# Patient Record
Sex: FEMALE | Race: OTHER | HISPANIC OR LATINO | URBAN - METROPOLITAN AREA
[De-identification: names, ages, dates, MRNs, and addresses within clinical notes are randomized per-mention and may not be internally consistent; named-entity substitution may affect disease eponyms.]

---

## 2017-02-01 ENCOUNTER — OUTPATIENT (OUTPATIENT)
Dept: OUTPATIENT SERVICES | Facility: HOSPITAL | Age: 32
LOS: 1 days | End: 2017-02-01
Payer: SELF-PAY

## 2017-02-01 ENCOUNTER — LABORATORY RESULT (OUTPATIENT)
Age: 32
End: 2017-02-01

## 2017-02-01 ENCOUNTER — APPOINTMENT (OUTPATIENT)
Dept: INTERNAL MEDICINE | Facility: CLINIC | Age: 32
End: 2017-02-01

## 2017-02-01 VITALS
SYSTOLIC BLOOD PRESSURE: 142 MMHG | HEIGHT: 68 IN | BODY MASS INDEX: 29.7 KG/M2 | WEIGHT: 196 LBS | DIASTOLIC BLOOD PRESSURE: 90 MMHG

## 2017-02-01 DIAGNOSIS — E55.9 VITAMIN D DEFICIENCY, UNSPECIFIED: ICD-10-CM

## 2017-02-01 DIAGNOSIS — R21 RASH AND OTHER NONSPECIFIC SKIN ERUPTION: ICD-10-CM

## 2017-02-01 DIAGNOSIS — R42 DIZZINESS AND GIDDINESS: ICD-10-CM

## 2017-02-01 DIAGNOSIS — Z78.9 OTHER SPECIFIED HEALTH STATUS: ICD-10-CM

## 2017-02-01 DIAGNOSIS — R35.8 XXOTHER POLYURIA: ICD-10-CM

## 2017-02-01 DIAGNOSIS — L50.8 OTHER URTICARIA: ICD-10-CM

## 2017-02-01 DIAGNOSIS — F17.210 NICOTINE DEPENDENCE, CIGARETTES, UNCOMPLICATED: ICD-10-CM

## 2017-02-01 DIAGNOSIS — Z00.00 ENCOUNTER FOR GENERAL ADULT MEDICAL EXAMINATION WITHOUT ABNORMAL FINDINGS: ICD-10-CM

## 2017-02-01 DIAGNOSIS — I48.1 PERSISTENT ATRIAL FIBRILLATION: ICD-10-CM

## 2017-02-01 DIAGNOSIS — Z91.09 OTHER ALLERGY STATUS, OTHER THAN TO DRUGS AND BIOLOGICAL SUBSTANCES: ICD-10-CM

## 2017-02-01 DIAGNOSIS — I10 ESSENTIAL (PRIMARY) HYPERTENSION: ICD-10-CM

## 2017-02-01 DIAGNOSIS — T78.1XXA OTHER ADVERSE FOOD REACTIONS, NOT ELSEWHERE CLASSIFIED, INITIAL ENCOUNTER: ICD-10-CM

## 2017-02-01 DIAGNOSIS — J32.9 CHRONIC SINUSITIS, UNSPECIFIED: ICD-10-CM

## 2017-02-01 PROCEDURE — 90688 IIV4 VACCINE SPLT 0.5 ML IM: CPT

## 2017-02-01 PROCEDURE — G0463: CPT

## 2017-02-01 PROCEDURE — G0008: CPT

## 2017-02-02 DIAGNOSIS — R35.8 OTHER POLYURIA: ICD-10-CM

## 2017-02-02 DIAGNOSIS — Z23 ENCOUNTER FOR IMMUNIZATION: ICD-10-CM

## 2017-02-02 DIAGNOSIS — E66.3 OVERWEIGHT: ICD-10-CM

## 2017-02-02 DIAGNOSIS — J32.9 CHRONIC SINUSITIS, UNSPECIFIED: ICD-10-CM

## 2017-02-02 LAB
APPEARANCE: CLEAR
BILIRUBIN URINE: NEGATIVE
BLOOD URINE: NEGATIVE
COLOR: YELLOW
GLUCOSE QUALITATIVE U: NORMAL MG/DL
KETONES URINE: ABNORMAL
LEUKOCYTE ESTERASE URINE: NEGATIVE
NITRITE URINE: NEGATIVE
PH URINE: 5.5
PROTEIN URINE: NEGATIVE MG/DL
SPECIFIC GRAVITY URINE: 1.02
UROBILINOGEN URINE: NORMAL MG/DL

## 2017-02-27 ENCOUNTER — APPOINTMENT (OUTPATIENT)
Dept: OTOLARYNGOLOGY | Facility: CLINIC | Age: 32
End: 2017-02-27

## 2017-03-20 ENCOUNTER — APPOINTMENT (OUTPATIENT)
Dept: OTOLARYNGOLOGY | Facility: CLINIC | Age: 32
End: 2017-03-20

## 2017-03-20 VITALS
WEIGHT: 196 LBS | BODY MASS INDEX: 29.7 KG/M2 | HEIGHT: 68 IN | DIASTOLIC BLOOD PRESSURE: 85 MMHG | SYSTOLIC BLOOD PRESSURE: 137 MMHG | HEART RATE: 103 BPM

## 2017-08-14 ENCOUNTER — OUTPATIENT (OUTPATIENT)
Dept: OUTPATIENT SERVICES | Facility: HOSPITAL | Age: 32
LOS: 1 days | Discharge: HOME | End: 2017-08-14

## 2017-08-14 ENCOUNTER — APPOINTMENT (OUTPATIENT)
Dept: OTOLARYNGOLOGY | Facility: CLINIC | Age: 32
End: 2017-08-14
Payer: COMMERCIAL

## 2017-08-14 PROCEDURE — 99213 OFFICE O/P EST LOW 20 MIN: CPT

## 2017-08-24 DIAGNOSIS — J45.909 UNSPECIFIED ASTHMA, UNCOMPLICATED: ICD-10-CM

## 2017-08-24 DIAGNOSIS — J33.9 NASAL POLYP, UNSPECIFIED: ICD-10-CM

## 2017-08-24 DIAGNOSIS — J32.9 CHRONIC SINUSITIS, UNSPECIFIED: ICD-10-CM

## 2017-09-13 ENCOUNTER — APPOINTMENT (OUTPATIENT)
Dept: INTERNAL MEDICINE | Facility: CLINIC | Age: 32
End: 2017-09-13

## 2017-09-27 ENCOUNTER — APPOINTMENT (OUTPATIENT)
Dept: INTERNAL MEDICINE | Facility: CLINIC | Age: 32
End: 2017-09-27

## 2017-10-04 ENCOUNTER — APPOINTMENT (OUTPATIENT)
Dept: INTERNAL MEDICINE | Facility: CLINIC | Age: 32
End: 2017-10-04

## 2017-10-18 ENCOUNTER — APPOINTMENT (OUTPATIENT)
Dept: INTERNAL MEDICINE | Facility: CLINIC | Age: 32
End: 2017-10-18

## 2017-11-01 ENCOUNTER — APPOINTMENT (OUTPATIENT)
Dept: INTERNAL MEDICINE | Facility: CLINIC | Age: 32
End: 2017-11-01

## 2017-12-06 ENCOUNTER — LABORATORY RESULT (OUTPATIENT)
Age: 32
End: 2017-12-06

## 2017-12-06 ENCOUNTER — OUTPATIENT (OUTPATIENT)
Dept: OUTPATIENT SERVICES | Facility: HOSPITAL | Age: 32
LOS: 1 days | End: 2017-12-06
Payer: SELF-PAY

## 2017-12-06 ENCOUNTER — APPOINTMENT (OUTPATIENT)
Dept: INTERNAL MEDICINE | Facility: CLINIC | Age: 32
End: 2017-12-06

## 2017-12-06 VITALS
BODY MASS INDEX: 31.37 KG/M2 | SYSTOLIC BLOOD PRESSURE: 130 MMHG | DIASTOLIC BLOOD PRESSURE: 90 MMHG | WEIGHT: 207 LBS | HEIGHT: 68 IN

## 2017-12-06 DIAGNOSIS — I10 ESSENTIAL (PRIMARY) HYPERTENSION: ICD-10-CM

## 2017-12-06 DIAGNOSIS — F17.200 NICOTINE DEPENDENCE, UNSPECIFIED, UNCOMPLICATED: ICD-10-CM

## 2017-12-06 LAB
HCT VFR BLD CALC: 46.3 % — SIGNIFICANT CHANGE UP (ref 39–50)
HGB BLD-MCNC: 16.3 G/DL — SIGNIFICANT CHANGE UP (ref 13–17)
MCHC RBC-ENTMCNC: 32.1 PG — SIGNIFICANT CHANGE UP (ref 27–34)
MCHC RBC-ENTMCNC: 35.2 GM/DL — SIGNIFICANT CHANGE UP (ref 32–36)
MCV RBC AUTO: 91.1 FL — SIGNIFICANT CHANGE UP (ref 80–100)
PLATELET # BLD AUTO: 204 K/UL — SIGNIFICANT CHANGE UP (ref 150–400)
RBC # BLD: 5.08 M/UL — SIGNIFICANT CHANGE UP (ref 4.2–5.8)
RBC # FLD: 12.6 % — SIGNIFICANT CHANGE UP (ref 10.3–14.5)
WBC # BLD: 10.1 K/UL — SIGNIFICANT CHANGE UP (ref 3.8–10.5)
WBC # FLD AUTO: 10.1 K/UL — SIGNIFICANT CHANGE UP (ref 3.8–10.5)

## 2017-12-06 PROCEDURE — G0463: CPT

## 2017-12-06 PROCEDURE — 85027 COMPLETE CBC AUTOMATED: CPT

## 2017-12-06 PROCEDURE — 80061 LIPID PANEL: CPT

## 2017-12-06 PROCEDURE — 80053 COMPREHEN METABOLIC PANEL: CPT

## 2017-12-06 RX ORDER — FEXOFENADINE HYDROCHLORIDE 180 MG/1
180 TABLET ORAL DAILY
Qty: 30 | Refills: 2 | Status: COMPLETED | COMMUNITY
Start: 2017-08-14 | End: 2017-12-06

## 2017-12-07 LAB
ALBUMIN SERPL ELPH-MCNC: 4.9 G/DL — SIGNIFICANT CHANGE UP (ref 3.3–5)
ALP SERPL-CCNC: 91 U/L — SIGNIFICANT CHANGE UP (ref 40–120)
ALT FLD-CCNC: 160 U/L — HIGH (ref 10–45)
ANION GAP SERPL CALC-SCNC: 16 MMOL/L — SIGNIFICANT CHANGE UP (ref 5–17)
AST SERPL-CCNC: 59 U/L — HIGH (ref 10–40)
BILIRUB SERPL-MCNC: 0.7 MG/DL — SIGNIFICANT CHANGE UP (ref 0.2–1.2)
BUN SERPL-MCNC: 12 MG/DL — SIGNIFICANT CHANGE UP (ref 7–23)
CALCIUM SERPL-MCNC: 9.5 MG/DL — SIGNIFICANT CHANGE UP (ref 8.4–10.5)
CHLORIDE SERPL-SCNC: 100 MMOL/L — SIGNIFICANT CHANGE UP (ref 96–108)
CHOLEST SERPL-MCNC: 207 MG/DL — HIGH (ref 10–199)
CO2 SERPL-SCNC: 26 MMOL/L — SIGNIFICANT CHANGE UP (ref 22–31)
CREAT SERPL-MCNC: 0.81 MG/DL — SIGNIFICANT CHANGE UP (ref 0.5–1.3)
GLUCOSE SERPL-MCNC: 86 MG/DL — SIGNIFICANT CHANGE UP (ref 70–99)
HDLC SERPL-MCNC: 39 MG/DL — LOW (ref 40–125)
LIPID PNL WITH DIRECT LDL SERPL: 121 MG/DL — SIGNIFICANT CHANGE UP
POTASSIUM SERPL-MCNC: 4.1 MMOL/L — SIGNIFICANT CHANGE UP (ref 3.5–5.3)
POTASSIUM SERPL-SCNC: 4.1 MMOL/L — SIGNIFICANT CHANGE UP (ref 3.5–5.3)
PROT SERPL-MCNC: 7.5 G/DL — SIGNIFICANT CHANGE UP (ref 6–8.3)
SODIUM SERPL-SCNC: 142 MMOL/L — SIGNIFICANT CHANGE UP (ref 135–145)
TOTAL CHOLESTEROL/HDL RATIO MEASUREMENT: 5.3 RATIO — SIGNIFICANT CHANGE UP (ref 3.4–9.6)
TRIGL SERPL-MCNC: 235 MG/DL — HIGH (ref 10–149)

## 2017-12-14 DIAGNOSIS — J32.9 CHRONIC SINUSITIS, UNSPECIFIED: ICD-10-CM

## 2017-12-14 DIAGNOSIS — E66.3 OVERWEIGHT: ICD-10-CM

## 2017-12-15 ENCOUNTER — LABORATORY RESULT (OUTPATIENT)
Age: 32
End: 2017-12-15

## 2017-12-15 ENCOUNTER — APPOINTMENT (OUTPATIENT)
Dept: INTERNAL MEDICINE | Facility: CLINIC | Age: 32
End: 2017-12-15

## 2017-12-15 ENCOUNTER — OUTPATIENT (OUTPATIENT)
Dept: OUTPATIENT SERVICES | Facility: HOSPITAL | Age: 32
LOS: 1 days | End: 2017-12-15
Payer: SELF-PAY

## 2017-12-15 VITALS
SYSTOLIC BLOOD PRESSURE: 120 MMHG | HEIGHT: 68 IN | DIASTOLIC BLOOD PRESSURE: 82 MMHG | HEART RATE: 66 BPM | BODY MASS INDEX: 30.31 KG/M2 | WEIGHT: 200 LBS | OXYGEN SATURATION: 98 %

## 2017-12-15 DIAGNOSIS — I10 ESSENTIAL (PRIMARY) HYPERTENSION: ICD-10-CM

## 2017-12-15 DIAGNOSIS — J32.9 CHRONIC SINUSITIS, UNSPECIFIED: ICD-10-CM

## 2017-12-15 LAB
ALBUMIN SERPL ELPH-MCNC: 5 G/DL — SIGNIFICANT CHANGE UP (ref 3.3–5)
ALP SERPL-CCNC: 77 U/L — SIGNIFICANT CHANGE UP (ref 40–120)
ALT FLD-CCNC: 179 U/L — HIGH (ref 10–45)
ANION GAP SERPL CALC-SCNC: 16 MMOL/L — SIGNIFICANT CHANGE UP (ref 5–17)
AST SERPL-CCNC: 75 U/L — HIGH (ref 10–40)
BILIRUB SERPL-MCNC: 0.9 MG/DL — SIGNIFICANT CHANGE UP (ref 0.2–1.2)
BUN SERPL-MCNC: 12 MG/DL — SIGNIFICANT CHANGE UP (ref 7–23)
CALCIUM SERPL-MCNC: 10.2 MG/DL — SIGNIFICANT CHANGE UP (ref 8.4–10.5)
CHLORIDE SERPL-SCNC: 100 MMOL/L — SIGNIFICANT CHANGE UP (ref 96–108)
CO2 SERPL-SCNC: 25 MMOL/L — SIGNIFICANT CHANGE UP (ref 22–31)
CREAT SERPL-MCNC: 0.81 MG/DL — SIGNIFICANT CHANGE UP (ref 0.5–1.3)
GLUCOSE SERPL-MCNC: 94 MG/DL — SIGNIFICANT CHANGE UP (ref 70–99)
IRON SATN MFR SERPL: 39 % — SIGNIFICANT CHANGE UP (ref 16–55)
IRON SATN MFR SERPL: 98 UG/DL — SIGNIFICANT CHANGE UP (ref 45–165)
POTASSIUM SERPL-MCNC: 4.6 MMOL/L — SIGNIFICANT CHANGE UP (ref 3.5–5.3)
POTASSIUM SERPL-SCNC: 4.6 MMOL/L — SIGNIFICANT CHANGE UP (ref 3.5–5.3)
PROT SERPL-MCNC: 7.8 G/DL — SIGNIFICANT CHANGE UP (ref 6–8.3)
SODIUM SERPL-SCNC: 141 MMOL/L — SIGNIFICANT CHANGE UP (ref 135–145)
TIBC SERPL-MCNC: 252 UG/DL — SIGNIFICANT CHANGE UP (ref 220–430)
UIBC SERPL-MCNC: 154 UG/DL — SIGNIFICANT CHANGE UP (ref 110–370)

## 2017-12-15 PROCEDURE — 80053 COMPREHEN METABOLIC PANEL: CPT

## 2017-12-15 PROCEDURE — G0463: CPT

## 2017-12-15 PROCEDURE — 83550 IRON BINDING TEST: CPT

## 2017-12-15 PROCEDURE — 86704 HEP B CORE ANTIBODY TOTAL: CPT

## 2017-12-15 PROCEDURE — 87340 HEPATITIS B SURFACE AG IA: CPT

## 2017-12-15 PROCEDURE — 82728 ASSAY OF FERRITIN: CPT

## 2017-12-15 PROCEDURE — 86803 HEPATITIS C AB TEST: CPT

## 2017-12-15 PROCEDURE — 87389 HIV-1 AG W/HIV-1&-2 AB AG IA: CPT

## 2017-12-15 PROCEDURE — 86706 HEP B SURFACE ANTIBODY: CPT

## 2017-12-16 LAB
FERRITIN SERPL-MCNC: 932 NG/ML — HIGH (ref 30–400)
HBV CORE AB SER-ACNC: SIGNIFICANT CHANGE UP
HBV SURFACE AB SER-ACNC: SIGNIFICANT CHANGE UP
HBV SURFACE AG SER-ACNC: SIGNIFICANT CHANGE UP
HCV AB S/CO SERPL IA: 0.23 S/CO — SIGNIFICANT CHANGE UP
HCV AB SERPL-IMP: SIGNIFICANT CHANGE UP
HIV 1+2 AB+HIV1 P24 AG SERPL QL IA: SIGNIFICANT CHANGE UP

## 2017-12-21 DIAGNOSIS — R74.0 NONSPECIFIC ELEVATION OF LEVELS OF TRANSAMINASE AND LACTIC ACID DEHYDROGENASE [LDH]: ICD-10-CM

## 2018-01-31 ENCOUNTER — APPOINTMENT (OUTPATIENT)
Dept: PULMONOLOGY | Facility: CLINIC | Age: 33
End: 2018-01-31
Payer: COMMERCIAL

## 2018-01-31 VITALS
DIASTOLIC BLOOD PRESSURE: 80 MMHG | HEIGHT: 68 IN | HEART RATE: 73 BPM | RESPIRATION RATE: 16 BRPM | WEIGHT: 194 LBS | SYSTOLIC BLOOD PRESSURE: 130 MMHG | BODY MASS INDEX: 29.4 KG/M2 | TEMPERATURE: 99 F

## 2018-01-31 DIAGNOSIS — J30.9 ALLERGIC RHINITIS, UNSPECIFIED: ICD-10-CM

## 2018-01-31 DIAGNOSIS — E66.3 OVERWEIGHT: ICD-10-CM

## 2018-01-31 PROCEDURE — 94726 PLETHYSMOGRAPHY LUNG VOLUMES: CPT | Mod: GC

## 2018-01-31 PROCEDURE — 94729 DIFFUSING CAPACITY: CPT | Mod: GC

## 2018-01-31 PROCEDURE — 94060 EVALUATION OF WHEEZING: CPT | Mod: GC

## 2018-01-31 PROCEDURE — 99214 OFFICE O/P EST MOD 30 MIN: CPT | Mod: 25,GC

## 2018-01-31 PROCEDURE — ZZZZZ: CPT

## 2018-03-16 ENCOUNTER — APPOINTMENT (OUTPATIENT)
Dept: PEDIATRIC ALLERGY IMMUNOLOGY | Facility: CLINIC | Age: 33
End: 2018-03-16

## 2018-04-16 ENCOUNTER — APPOINTMENT (OUTPATIENT)
Dept: OTOLARYNGOLOGY | Facility: CLINIC | Age: 33
End: 2018-04-16

## 2018-06-18 ENCOUNTER — OUTPATIENT (OUTPATIENT)
Dept: OUTPATIENT SERVICES | Facility: HOSPITAL | Age: 33
LOS: 1 days | Discharge: HOME | End: 2018-06-18

## 2018-06-18 ENCOUNTER — APPOINTMENT (OUTPATIENT)
Dept: OTOLARYNGOLOGY | Facility: CLINIC | Age: 33
End: 2018-06-18
Payer: COMMERCIAL

## 2018-06-18 VITALS — HEIGHT: 68 IN | BODY MASS INDEX: 31.37 KG/M2 | WEIGHT: 207 LBS

## 2018-06-18 PROCEDURE — 99213 OFFICE O/P EST LOW 20 MIN: CPT

## 2018-06-25 DIAGNOSIS — J32.9 CHRONIC SINUSITIS, UNSPECIFIED: ICD-10-CM

## 2018-06-25 DIAGNOSIS — J33.9 NASAL POLYP, UNSPECIFIED: ICD-10-CM

## 2020-12-06 ENCOUNTER — EMERGENCY (EMERGENCY)
Facility: HOSPITAL | Age: 35
LOS: 1 days | Discharge: HOME | End: 2020-12-06
Attending: EMERGENCY MEDICINE
Payer: MEDICAID

## 2020-12-06 VITALS
WEIGHT: 199.96 LBS | OXYGEN SATURATION: 98 % | TEMPERATURE: 98 F | RESPIRATION RATE: 18 BRPM | SYSTOLIC BLOOD PRESSURE: 160 MMHG | HEIGHT: 66 IN | HEART RATE: 101 BPM | DIASTOLIC BLOOD PRESSURE: 91 MMHG

## 2020-12-06 VITALS
HEART RATE: 80 BPM | OXYGEN SATURATION: 99 % | SYSTOLIC BLOOD PRESSURE: 130 MMHG | RESPIRATION RATE: 18 BRPM | TEMPERATURE: 99 F | DIASTOLIC BLOOD PRESSURE: 85 MMHG

## 2020-12-06 DIAGNOSIS — I10 ESSENTIAL (PRIMARY) HYPERTENSION: ICD-10-CM

## 2020-12-06 LAB
ALBUMIN SERPL ELPH-MCNC: 4.8 G/DL — SIGNIFICANT CHANGE UP (ref 3.3–5)
ALP SERPL-CCNC: 80 U/L — SIGNIFICANT CHANGE UP (ref 40–120)
ALT FLD-CCNC: 106 U/L — HIGH (ref 10–45)
ANION GAP SERPL CALC-SCNC: 14 MMOL/L — SIGNIFICANT CHANGE UP (ref 5–17)
AST SERPL-CCNC: 48 U/L — HIGH (ref 10–40)
BASOPHILS # BLD AUTO: 0.06 K/UL — SIGNIFICANT CHANGE UP (ref 0–0.2)
BASOPHILS NFR BLD AUTO: 0.7 % — SIGNIFICANT CHANGE UP (ref 0–2)
BILIRUB SERPL-MCNC: 0.6 MG/DL — SIGNIFICANT CHANGE UP (ref 0.2–1.2)
BUN SERPL-MCNC: 6 MG/DL — LOW (ref 7–23)
CALCIUM SERPL-MCNC: 9.8 MG/DL — SIGNIFICANT CHANGE UP (ref 8.4–10.5)
CHLORIDE SERPL-SCNC: 101 MMOL/L — SIGNIFICANT CHANGE UP (ref 96–108)
CO2 SERPL-SCNC: 22 MMOL/L — SIGNIFICANT CHANGE UP (ref 22–31)
CREAT SERPL-MCNC: 0.7 MG/DL — SIGNIFICANT CHANGE UP (ref 0.5–1.3)
EOSINOPHIL # BLD AUTO: 0.27 K/UL — SIGNIFICANT CHANGE UP (ref 0–0.5)
EOSINOPHIL NFR BLD AUTO: 3.2 % — SIGNIFICANT CHANGE UP (ref 0–6)
GLUCOSE SERPL-MCNC: 118 MG/DL — HIGH (ref 70–99)
HCT VFR BLD CALC: 46 % — SIGNIFICANT CHANGE UP (ref 39–50)
HGB BLD-MCNC: 16 G/DL — SIGNIFICANT CHANGE UP (ref 13–17)
IMM GRANULOCYTES NFR BLD AUTO: 0.2 % — SIGNIFICANT CHANGE UP (ref 0–1.5)
LYMPHOCYTES # BLD AUTO: 1.99 K/UL — SIGNIFICANT CHANGE UP (ref 1–3.3)
LYMPHOCYTES # BLD AUTO: 23.5 % — SIGNIFICANT CHANGE UP (ref 13–44)
MCHC RBC-ENTMCNC: 31.7 PG — SIGNIFICANT CHANGE UP (ref 27–34)
MCHC RBC-ENTMCNC: 34.8 GM/DL — SIGNIFICANT CHANGE UP (ref 32–36)
MCV RBC AUTO: 91.3 FL — SIGNIFICANT CHANGE UP (ref 80–100)
MONOCYTES # BLD AUTO: 0.43 K/UL — SIGNIFICANT CHANGE UP (ref 0–0.9)
MONOCYTES NFR BLD AUTO: 5.1 % — SIGNIFICANT CHANGE UP (ref 2–14)
NEUTROPHILS # BLD AUTO: 5.71 K/UL — SIGNIFICANT CHANGE UP (ref 1.8–7.4)
NEUTROPHILS NFR BLD AUTO: 67.3 % — SIGNIFICANT CHANGE UP (ref 43–77)
NRBC # BLD: 0 /100 WBCS — SIGNIFICANT CHANGE UP (ref 0–0)
PLATELET # BLD AUTO: 209 K/UL — SIGNIFICANT CHANGE UP (ref 150–400)
POTASSIUM SERPL-MCNC: 3.9 MMOL/L — SIGNIFICANT CHANGE UP (ref 3.5–5.3)
POTASSIUM SERPL-SCNC: 3.9 MMOL/L — SIGNIFICANT CHANGE UP (ref 3.5–5.3)
PROT SERPL-MCNC: 7.4 G/DL — SIGNIFICANT CHANGE UP (ref 6–8.3)
RBC # BLD: 5.04 M/UL — SIGNIFICANT CHANGE UP (ref 4.2–5.8)
RBC # FLD: 11.9 % — SIGNIFICANT CHANGE UP (ref 10.3–14.5)
SARS-COV-2 RNA SPEC QL NAA+PROBE: SIGNIFICANT CHANGE UP
SODIUM SERPL-SCNC: 137 MMOL/L — SIGNIFICANT CHANGE UP (ref 135–145)
WBC # BLD: 8.48 K/UL — SIGNIFICANT CHANGE UP (ref 3.8–10.5)
WBC # FLD AUTO: 8.48 K/UL — SIGNIFICANT CHANGE UP (ref 3.8–10.5)

## 2020-12-06 PROCEDURE — U0003: CPT

## 2020-12-06 PROCEDURE — 99284 EMERGENCY DEPT VISIT MOD MDM: CPT | Mod: 25

## 2020-12-06 PROCEDURE — 85025 COMPLETE CBC W/AUTO DIFF WBC: CPT

## 2020-12-06 PROCEDURE — 80053 COMPREHEN METABOLIC PANEL: CPT

## 2020-12-06 PROCEDURE — 71046 X-RAY EXAM CHEST 2 VIEWS: CPT | Mod: 26

## 2020-12-06 PROCEDURE — 99284 EMERGENCY DEPT VISIT MOD MDM: CPT

## 2020-12-06 PROCEDURE — 71046 X-RAY EXAM CHEST 2 VIEWS: CPT

## 2020-12-06 NOTE — ED PROVIDER NOTE - PHYSICAL EXAMINATION
Gen: AAOx3, non-toxic  Head: NCAT  HEENT: Uvula at midline, no tish under the tongue, tolerating secretions, speaking in full sentences, no stridor or wheezing. No nasal polyps noticed. EOMI, oral mucosa moist, normal conjunctiva  Lung: CTAB, no respiratory distress, no wheezes/rhonchi/rales B/L, speaking in full sentences  CV: RRR, no murmurs, rubs or gallops  Abd: soft, NTND, no guarding, no CVA tenderness  MSK: no visible deformities  Neuro: No focal sensory or motor deficits, normal CN exam   Skin: Warm, well perfused, no rash  Psych: normal affect.

## 2020-12-06 NOTE — ED PROVIDER NOTE - PATIENT PORTAL LINK FT
You can access the FollowMyHealth Patient Portal offered by [facility] by registering at the following website: [email or web address]. By joining [facility]’s FollowMyHealth portal, you will also be able to view your health information using other applications (apps) compatible with our system.

## 2020-12-06 NOTE — ED PROVIDER NOTE - NSFOLLOWUPINSTRUCTIONS_ED_ALL_ED_FT
You were seen in the ED for shortness of breath  The following labs/imaging were obtained: see attached (if applicable)  Continue home medications (if any).   Return to the ED if you develop fever, swelling of your tongue, worsening shortness of breath or   worsening or new concerning symptoms.  Follow up with your primary care in 2-3 days. See attached. Follow up with your ENT doctor within 2 weeks.   Discussed with pt results of work up, strict return precautions, and need for follow up.  Pt expressed understanding and agrees with plan.

## 2020-12-06 NOTE — ED PROVIDER NOTE - ATTENDING CONTRIBUTION TO CARE
Patient presenting with approximately one month of shortness of breath and two weeks of feeling like his throat is closing.  No associated fevers, chills or chest pains, no diaphoresis, nausea, vomiting.  History of ENT issues, has appointment with them in two weeks.    Exam:  General: Patient well appearing, vital signs within normal limits  HEENT: airway patent with moist mucous membranes  Cardiac: RRR S1/S2 with strong peripheral pulses  Respiratory: lungs clear without respiratory distress  GI: abdomen soft, non tender, non distended  Neuro: no gross neurologic deficits  Skin: warm, well perfused  Psych: normal mood and affect    Patient presenting with multiple weeks of subjective SOB and throat closing sensation but unremarkable exam, no risk factors for ACS, unremarkable EKG - plan for screening labs/CXR in Emergency Department, will test for COVID, if workup unremarkable likely follow up as outpatient.

## 2020-12-06 NOTE — ED PROVIDER NOTE - CLINICAL SUMMARY MEDICAL DECISION MAKING FREE TEXT BOX
33 yo M with hx of eczema, seasonal allergies and nasal polyps but no asthma presents with SOB for several weeks, worsening today and feeling "like my throat is going to close". Not febrile, VS WNL. Uvula at midline, no tish under the tongue, tolerating secretions, speaking in full sentences, no stridor or wheezing. Clear lungs. No nasal polyps noticed. Ddx worsening seasonal allergies causing post-nasal drip vs unlikely pneumonia / covid-19. No concern for epiglottitis,  PTA or Ledwig angina. Will get basic labs, CXR, covid-19 test and reassess

## 2020-12-06 NOTE — ED ADULT TRIAGE NOTE - CHIEF COMPLAINT QUOTE
chest pressure. difficulty breathing. "muscles don't feel as strong" x 2 weeks. on allergy meds but no relief.

## 2020-12-06 NOTE — ED PROVIDER NOTE - NSFOLLOWUPCLINICS_GEN_ALL_ED_FT
[facility] General Internal Medicine  General Internal Medicine  [address] [id number]  Phone: [phone]  Fax:   Follow Up Time:

## 2020-12-06 NOTE — ED PROVIDER NOTE - OBJECTIVE STATEMENT
33 yo M with hx of eczema, seasonal allergies and nasal polyps but no asthma presents with SOB for several weeks, worsening today and feeling "like my throat is going to close". Reports non-exertional, non-positional SOB with no fever, cough, chest pain, leg swelling, sick contacts or recent travel. Has been using a steroid spray with no improvement. Has follow up with ENT in 2 weeks. Able to swallow, speaking in full sentences, fully immunized. Also reports blt leg weakness but no back pain or urinary / bowel incontinence.

## 2020-12-22 ENCOUNTER — APPOINTMENT (OUTPATIENT)
Dept: INTERNAL MEDICINE | Facility: CLINIC | Age: 35
End: 2020-12-22

## 2020-12-22 ENCOUNTER — OUTPATIENT (OUTPATIENT)
Dept: OUTPATIENT SERVICES | Facility: HOSPITAL | Age: 35
LOS: 1 days | End: 2020-12-22
Payer: SELF-PAY

## 2020-12-22 ENCOUNTER — NON-APPOINTMENT (OUTPATIENT)
Age: 35
End: 2020-12-22

## 2020-12-22 ENCOUNTER — LABORATORY RESULT (OUTPATIENT)
Age: 35
End: 2020-12-22

## 2020-12-22 VITALS
OXYGEN SATURATION: 98 % | DIASTOLIC BLOOD PRESSURE: 80 MMHG | SYSTOLIC BLOOD PRESSURE: 128 MMHG | BODY MASS INDEX: 30.01 KG/M2 | HEART RATE: 78 BPM | HEIGHT: 68 IN | WEIGHT: 198 LBS

## 2020-12-22 DIAGNOSIS — I10 ESSENTIAL (PRIMARY) HYPERTENSION: ICD-10-CM

## 2020-12-22 DIAGNOSIS — J45.909 UNSPECIFIED ASTHMA, UNCOMPLICATED: ICD-10-CM

## 2020-12-22 DIAGNOSIS — Z91.09 OTHER ALLERGY STATUS, OTHER THAN TO DRUGS AND BIOLOGICAL SUBSTANCES: ICD-10-CM

## 2020-12-22 PROCEDURE — 83036 HEMOGLOBIN GLYCOSYLATED A1C: CPT

## 2020-12-22 PROCEDURE — G0463: CPT

## 2020-12-22 PROCEDURE — 80053 COMPREHEN METABOLIC PANEL: CPT

## 2020-12-22 PROCEDURE — 80061 LIPID PANEL: CPT

## 2020-12-22 PROCEDURE — 85027 COMPLETE CBC AUTOMATED: CPT

## 2020-12-22 PROCEDURE — 84443 ASSAY THYROID STIM HORMONE: CPT

## 2020-12-22 PROCEDURE — 36415 COLL VENOUS BLD VENIPUNCTURE: CPT

## 2020-12-23 ENCOUNTER — APPOINTMENT (OUTPATIENT)
Dept: INTERNAL MEDICINE | Facility: CLINIC | Age: 35
End: 2020-12-23

## 2020-12-23 ENCOUNTER — MED ADMIN CHARGE (OUTPATIENT)
Age: 35
End: 2020-12-23

## 2020-12-23 LAB
ALBUMIN SERPL ELPH-MCNC: 4.9 G/DL — SIGNIFICANT CHANGE UP (ref 3.3–5)
ALP SERPL-CCNC: 82 U/L — SIGNIFICANT CHANGE UP (ref 40–120)
ALT FLD-CCNC: 90 U/L — HIGH (ref 10–45)
ANION GAP SERPL CALC-SCNC: 14 MMOL/L — SIGNIFICANT CHANGE UP (ref 5–17)
AST SERPL-CCNC: 57 U/L — HIGH (ref 10–40)
BILIRUB SERPL-MCNC: 1 MG/DL — SIGNIFICANT CHANGE UP (ref 0.2–1.2)
BUN SERPL-MCNC: 8 MG/DL — SIGNIFICANT CHANGE UP (ref 7–23)
CALCIUM SERPL-MCNC: 9.8 MG/DL — SIGNIFICANT CHANGE UP (ref 8.4–10.5)
CHLORIDE SERPL-SCNC: 99 MMOL/L — SIGNIFICANT CHANGE UP (ref 96–108)
CHOLEST SERPL-MCNC: 210 MG/DL — HIGH
CO2 SERPL-SCNC: 27 MMOL/L — SIGNIFICANT CHANGE UP (ref 22–31)
CREAT SERPL-MCNC: 0.79 MG/DL — SIGNIFICANT CHANGE UP (ref 0.5–1.3)
GLUCOSE SERPL-MCNC: 89 MG/DL — SIGNIFICANT CHANGE UP (ref 70–99)
HCT VFR BLD CALC: 49.6 % — SIGNIFICANT CHANGE UP (ref 39–50)
HDLC SERPL-MCNC: 47 MG/DL — SIGNIFICANT CHANGE UP
HGB BLD-MCNC: 16.8 G/DL — SIGNIFICANT CHANGE UP (ref 13–17)
LIPID PNL WITH DIRECT LDL SERPL: 127 MG/DL — HIGH
MCHC RBC-ENTMCNC: 32.3 PG — SIGNIFICANT CHANGE UP (ref 27–34)
MCHC RBC-ENTMCNC: 33.9 GM/DL — SIGNIFICANT CHANGE UP (ref 32–36)
MCV RBC AUTO: 95.4 FL — SIGNIFICANT CHANGE UP (ref 80–100)
NON HDL CHOLESTEROL: 162 MG/DL — HIGH
PLATELET # BLD AUTO: 186 K/UL — SIGNIFICANT CHANGE UP (ref 150–400)
POTASSIUM SERPL-MCNC: 4.1 MMOL/L — SIGNIFICANT CHANGE UP (ref 3.5–5.3)
POTASSIUM SERPL-SCNC: 4.1 MMOL/L — SIGNIFICANT CHANGE UP (ref 3.5–5.3)
PROT SERPL-MCNC: 7.7 G/DL — SIGNIFICANT CHANGE UP (ref 6–8.3)
RBC # BLD: 5.2 M/UL — SIGNIFICANT CHANGE UP (ref 4.2–5.8)
RBC # FLD: 12.5 % — SIGNIFICANT CHANGE UP (ref 10.3–14.5)
SODIUM SERPL-SCNC: 141 MMOL/L — SIGNIFICANT CHANGE UP (ref 135–145)
T4 FREE+ TSH PNL SERPL: 0.47 UIU/ML — SIGNIFICANT CHANGE UP (ref 0.27–4.2)
TRIGL SERPL-MCNC: 178 MG/DL — HIGH
WBC # BLD: 10.85 K/UL — HIGH (ref 3.8–10.5)
WBC # FLD AUTO: 10.85 K/UL — HIGH (ref 3.8–10.5)

## 2020-12-24 LAB
A1C WITH ESTIMATED AVERAGE GLUCOSE RESULT: 5.9 % — HIGH (ref 4–5.6)
ESTIMATED AVERAGE GLUCOSE: 123 MG/DL — HIGH (ref 68–114)

## 2020-12-25 PROBLEM — J45.909 ASTHMA: Status: ACTIVE | Noted: 2018-01-31

## 2020-12-25 NOTE — HISTORY OF PRESENT ILLNESS
[de-identified] :  ID # [id number]\par 35 year old male Pmhx chronic sinusitis and nasal polyps who presents for CPE.Reports feeling nasal congestion on a daily basis. Started ~ 7 years ago. Previously saw ENT in 2017/2018.  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 No surgical intervention at that time. Saw pulmonology in 2018 with normal PFTs, although diagnosis of asthma documented. Pulm stated patient has allergic rhinitis. Patient uses flonase bid but feels like it is not helping. \par He also notes some tinnitus in the R ear that occurred this week. Occasionally feels dizzy for a few seconds. No nausea or vomiting with episodes. \par Quit smoking 5 years ago. He smoked for roughly 7 years. He smoked ~ 5 cigs a day. \par Otherwise the patient reports feeling well. He reports eating a fairly healthy diet. 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

## 2020-12-25 NOTE — REVIEW OF SYSTEMS
[Fatigue] : fatigue [Fever] : no fever [Chills] : no chills [Discharge] : no discharge [Vision Problems] : no vision problems [Earache] : no earache [Hearing Loss] : no hearing loss [Postnasal Drip] : no postnasal drip [Nasal Discharge] : no nasal discharge [Sore Throat] : no sore throat [Chest Pain] : no chest pain [Palpitations] : no palpitations [Lower Ext Edema] : no lower extremity edema [Shortness Of Breath] : no shortness of breath [Wheezing] : no wheezing [Cough] : no cough [Abdominal Pain] : no abdominal pain [Nausea] : no nausea [Constipation] : no constipation [Vomiting] : no vomiting [Headache] : no headache [Fainting] : no fainting [Unsteady Walk] : no ataxia

## 2020-12-25 NOTE — ASSESSMENT
[FreeTextEntry1] : 35 year old male Pmhx chronic sinusitis and nasal polyps who presents for CPE.\par \par #Chronic sinusitis\par -History and physical exam are consistent with chronic sinusitis rather than an acute sinusitis. No fevers, nasal discharge or tenderness over the sinuses. Chronic sinusitis in the setting of environmental allergies. Previously seen by ENT but did not follow-up. Flonase is providing minimal relief at this time. \par -c/w Flonase bid\par -Claritin prn \par -ENT referral \par -Allergy referral \par -Flu shot today\par \par #r/o asthma\par -some question of asthma since it was documented by pulmonology. However, PFT's x 2 have been wnl. Patient denies SOB or wheezing. However, will administer pneumovax today given diagnosis\par -CTM\par \par #Obesity \par -BMI 30.1\par -counseled on diet and exercise \par -will check lipids \par -check TSH, A1c\par \par #HCM\par -CBC, CMP\par -PHQ-2 0\par -up to date with vaccinations and screening

## 2020-12-25 NOTE — PHYSICAL EXAM
[No Acute Distress] : no acute distress [Well Nourished] : well nourished [Well Developed] : well developed [Well-Appearing] : well-appearing [Normal Sclera/Conjunctiva] : normal sclera/conjunctiva [PERRL] : pupils equal round and reactive to light [EOMI] : extraocular movements intact [No JVD] : no jugular venous distention [No Lymphadenopathy] : no lymphadenopathy [Supple] : supple [No Respiratory Distress] : no respiratory distress  [No Accessory Muscle Use] : no accessory muscle use [Clear to Auscultation] : lungs were clear to auscultation bilaterally [Normal Rate] : normal rate  [Regular Rhythm] : with a regular rhythm [Normal S1, S2] : normal S1 and S2 [No Murmur] : no murmur heard [Soft] : abdomen soft [Non Tender] : non-tender [Non-distended] : non-distended [No Masses] : no abdominal mass palpated [No HSM] : no HSM [Normal Bowel Sounds] : normal bowel sounds [No Joint Swelling] : no joint swelling [No Rash] : no rash [No Focal Deficits] : no focal deficits [Normal Gait] : normal gait [de-identified] : No pain on palpation of maxillary or frontal sinuses. Inflammed nasal turbinates R > L. No nasal discharge. Monty OP

## 2020-12-28 DIAGNOSIS — R74.01 ELEVATION OF LEVELS OF LIVER TRANSAMINASE LEVELS: ICD-10-CM

## 2020-12-28 DIAGNOSIS — J30.9 ALLERGIC RHINITIS, UNSPECIFIED: ICD-10-CM

## 2020-12-28 DIAGNOSIS — J32.9 CHRONIC SINUSITIS, UNSPECIFIED: ICD-10-CM

## 2020-12-28 DIAGNOSIS — Z00.00 ENCOUNTER FOR GENERAL ADULT MEDICAL EXAMINATION WITHOUT ABNORMAL FINDINGS: ICD-10-CM

## 2020-12-28 DIAGNOSIS — Z91.09 OTHER ALLERGY STATUS, OTHER THAN TO DRUGS AND BIOLOGICAL SUBSTANCES: ICD-10-CM

## 2020-12-28 DIAGNOSIS — J45.909 UNSPECIFIED ASTHMA, UNCOMPLICATED: ICD-10-CM

## 2020-12-30 ENCOUNTER — NON-APPOINTMENT (OUTPATIENT)
Age: 35
End: 2020-12-30

## 2021-01-04 ENCOUNTER — APPOINTMENT (OUTPATIENT)
Dept: OTOLARYNGOLOGY | Facility: CLINIC | Age: 36
End: 2021-01-04

## 2021-01-06 ENCOUNTER — OUTPATIENT (OUTPATIENT)
Dept: OUTPATIENT SERVICES | Facility: HOSPITAL | Age: 36
LOS: 1 days | End: 2021-01-06
Payer: SELF-PAY

## 2021-01-06 ENCOUNTER — APPOINTMENT (OUTPATIENT)
Dept: INTERNAL MEDICINE | Facility: CLINIC | Age: 36
End: 2021-01-06
Payer: MEDICAID

## 2021-01-06 ENCOUNTER — APPOINTMENT (OUTPATIENT)
Dept: PEDIATRIC ALLERGY IMMUNOLOGY | Facility: CLINIC | Age: 36
End: 2021-01-06
Payer: COMMERCIAL

## 2021-01-06 VITALS
WEIGHT: 198 LBS | SYSTOLIC BLOOD PRESSURE: 140 MMHG | BODY MASS INDEX: 30.01 KG/M2 | HEIGHT: 68 IN | HEART RATE: 78 BPM | DIASTOLIC BLOOD PRESSURE: 80 MMHG | OXYGEN SATURATION: 97 %

## 2021-01-06 VITALS — HEART RATE: 65 BPM | WEIGHT: 200 LBS | BODY MASS INDEX: 30.41 KG/M2

## 2021-01-06 DIAGNOSIS — I10 ESSENTIAL (PRIMARY) HYPERTENSION: ICD-10-CM

## 2021-01-06 DIAGNOSIS — Z13.0 ENCOUNTER FOR SCREENING FOR OTHER SUSPECTED ENDOCRINE DISORDER: ICD-10-CM

## 2021-01-06 DIAGNOSIS — Z13.29 ENCOUNTER FOR SCREENING FOR OTHER SUSPECTED ENDOCRINE DISORDER: ICD-10-CM

## 2021-01-06 DIAGNOSIS — Z13.228 ENCOUNTER FOR SCREENING FOR OTHER SUSPECTED ENDOCRINE DISORDER: ICD-10-CM

## 2021-01-06 PROCEDURE — 99213 OFFICE O/P EST LOW 20 MIN: CPT | Mod: GE

## 2021-01-06 PROCEDURE — ZZZZZ: CPT

## 2021-01-06 PROCEDURE — G0463: CPT

## 2021-01-06 RX ORDER — FLUTICASONE PROPIONATE 50 UG/1
50 SPRAY, METERED NASAL TWICE DAILY
Qty: 1 | Refills: 5 | Status: COMPLETED | COMMUNITY
Start: 2017-03-20 | End: 2021-01-06

## 2021-01-06 NOTE — IMPRESSION
[Allergy Testing Dust Mite] : dust mites [Allergy Testing Mixed Feathers] : feathers [Allergy Testing Cockroach] : cockroach [Allergy Testing Dog] : dog [Allergy Testing Trees] : trees [Allergy Testing Weeds] : weeds [Allergy Testing Cat] : cat [] : molds [Allergy Testing Grasses] : grasses

## 2021-01-06 NOTE — PHYSICAL EXAM
[Normal Oropharynx] : the oropharynx was normal [Normal Nasal Mucosa] : the nasal mucosa was normal [No Lymphadenopathy] : no lymphadenopathy [Supple] : supple [No Edema] : there was no peripheral edema [Normal] : affect was normal and insight and judgment were intact

## 2021-01-07 LAB
ALBUMIN SERPL ELPH-MCNC: 5.1 G/DL
ALP BLD-CCNC: 91 U/L
ALT SERPL-CCNC: 64 U/L
ANION GAP SERPL CALC-SCNC: 14 MMOL/L
AST SERPL-CCNC: 31 U/L
BASOPHILS # BLD AUTO: 0.08 K/UL
BASOPHILS NFR BLD AUTO: 0.6 %
BILIRUB SERPL-MCNC: 0.7 MG/DL
BUN SERPL-MCNC: 14 MG/DL
CALCIUM SERPL-MCNC: 10.1 MG/DL
CHLORIDE SERPL-SCNC: 97 MMOL/L
CO2 SERPL-SCNC: 28 MMOL/L
CREAT SERPL-MCNC: 0.81 MG/DL
EOSINOPHIL # BLD AUTO: 0.32 K/UL
EOSINOPHIL NFR BLD AUTO: 2.5 %
GLUCOSE SERPL-MCNC: 88 MG/DL
HCT VFR BLD CALC: 49.3 %
HGB BLD-MCNC: 16.6 G/DL
IMM GRANULOCYTES NFR BLD AUTO: 0.2 %
LYMPHOCYTES # BLD AUTO: 3.92 K/UL
LYMPHOCYTES NFR BLD AUTO: 30.6 %
MAN DIFF?: NORMAL
MCHC RBC-ENTMCNC: 31.4 PG
MCHC RBC-ENTMCNC: 33.7 GM/DL
MCV RBC AUTO: 93.2 FL
MONOCYTES # BLD AUTO: 0.81 K/UL
MONOCYTES NFR BLD AUTO: 6.3 %
NEUTROPHILS # BLD AUTO: 7.64 K/UL
NEUTROPHILS NFR BLD AUTO: 59.8 %
PLATELET # BLD AUTO: 246 K/UL
POTASSIUM SERPL-SCNC: 4.4 MMOL/L
PROT SERPL-MCNC: 7.5 G/DL
RBC # BLD: 5.29 M/UL
RBC # FLD: 12.4 %
SODIUM SERPL-SCNC: 139 MMOL/L
TOTAL IGE SMQN RAST: 729 KU/L
WBC # FLD AUTO: 12.8 K/UL

## 2021-01-07 NOTE — REVIEW OF SYSTEMS
[Negative] : Gastrointestinal [Pain] : no pain [Vision Problems] : no vision problems [Earache] : no earache [Sore Throat] : no sore throat [Joint Pain] : no joint pain [Muscle Weakness] : no muscle weakness [Muscle Pain] : no muscle pain

## 2021-01-07 NOTE — HISTORY OF PRESENT ILLNESS
[FreeTextEntry1] : Facial Pressure [de-identified] : 35 year old male Pmhx chronic sinusitis and nasal polyps who presents for follow up. Patient complains of pressure around nose/ facial pressure and congestion for 3+ years. It is worse with seasonal changes and occurs a few days a week. Previously saw ENT in 2017/2018. 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 No surgical intervention at that time. Saw pulmonology in 2018 with normal PFTs, although diagnosis of asthma documented. Pulm stated patient has allergic rhinitis. Patient uses flonase bid but feels like it is not helping. Pt states that Claritin does help minimally. He is requesting ENT Referral. He denies any fever, chills, cough, SOB, sore throat.

## 2021-01-07 NOTE — ASSESSMENT
[FreeTextEntry1] : 35 year old male Pmhx chronic sinusitis and nasal polyps who presents for workup of Chronic Sinusitis. \par \par #Chronic Sinusitis \par No fevers, nasal discharge or tenderness over the sinuses. Chronic sinusitis in the setting of environmental allergies. Previously seen by ENT but did not follow-up. Flonase is providing minimal relief at this time. \par -c/w Flonase bid\par -Claritin prn \par -ENT referral \par -Allergy referral \par \par RTC as needed

## 2021-01-08 LAB
CH50 SERPL-MCNC: 61 U/ML
COMPLEMENT, ALTERNATE PATHWAY (AH50): 104
DEPRECATED KAPPA LC FREE/LAMBDA SER: 0.81 RATIO
IGA SER QL IEP: 348 MG/DL
IGG SER QL IEP: 989 MG/DL
IGM SER QL IEP: 179 MG/DL
KAPPA LC CSF-MCNC: 1.87 MG/DL
KAPPA LC SERPL-MCNC: 1.51 MG/DL

## 2021-01-08 NOTE — CONSULT LETTER
[Consult Letter:] : I had the pleasure of evaluating your patient, [unfilled]. [Please see my note below.] : Please see my note below. [This report is provisional, pending the completion of the evaluation.  A final diagnosis and plan will follow.] : This report is provisional, pending the completion of the evaluation.  A final diagnosis and plan will follow. [Consult Closing:] : Thank you very much for allowing me to participate in the care of this patient.  If you have any questions, please do not hesitate to contact me. [Sincerely,] : Sincerely, [Dear  ___] : Dear  [unfilled], [FreeTextEntry2] : Meño Marsh [FreeTextEntry3] : Benji Jackson MD\par ___________________________________\par Fellow, Division of Allergy and Immunology\par [facility]\par [facility]\par \par \par MD ALBERTINA Whelan FACAAI\par Adult and Pediatric Allergy, Asthma and Clinical Immunology\par  of Medicine and Pediatrics at\par   [facility]\par Section Head, Adult Allergy and Immunology\par   [facility]\par   Division of Allergy, Asthma and Immunology\par   [address] [place] [address]\par   [place], [place] [address]\par   Phone [phone]  Fax [phone]\par \par  \par

## 2021-01-08 NOTE — SOCIAL HISTORY
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

## 2021-01-08 NOTE — HISTORY OF PRESENT ILLNESS
[de-identified] : DAVID GUNDERSON is a 35 year old male with history of chronic sinusitis with nasal polyposis, allergic rhinitis, food allergies and ?asthma who presents for evaluation of worsening sinus congestion. Pt was previously known to [facility], but was lost to follow up. He recently re-established primary care and was referred by Dr Marsh ([facility] Internal [facility]). He reports worsening symptoms of congestion, sneezing, facial pressure and post nasal drip for the past few months. He was previously seen by A/I (Dr Burks) in 2016, and was found to have environmental allergies, as well as food allergies to shellfish, white fish, salmon, strawberry, squash and kiwi. He reports that in general, his sinus symptoms are worse in the summer season and associated with itchy/watery eyes.\par \par History of nasal polyposis:\par Nasal polyposis was previously noted on nasal endoscopy in 2017 (see report below). He has had 0 sinus surgeries. Over the last year he received 2 courses of antibiotics and steroids. He last saw ENT in 2018, and he was offered surgical resection of nasal polyps, but declined and decided to proceed with medical management. He reports that his symptoms were controlled on fluticasone 2 sprays BID. With recent worsening of symptoms, he tried Claritin, which did not help his symptoms. He also tried Benadryl which made him drowsy. He has since stopped taking antihistamines.  His sense of smell is fine and sense of taste is fine.\par \par History of asthma:\par He previously followed with pulmonary for asthma (per last note in 2018, he had normal PFTs and was discharged from their clinic); he denies any asthma symptoms and he no longer follows with them or uses any inhalers.\par \par History of NSAID allergy:\par He does not use NSAIDs. He does not use aspirin. He denies history of worsening sinus symptoms or dyspnea after NSAID use (has not used them). He denies history of respiratory symptoms after alcohol ingestion.\par \par Sinonasal outcome test (SNOT 22) - 11

## 2021-01-08 NOTE — DATA REVIEWED
[FreeTextEntry1] : Nasal endoscopy, Dr Callejas, 3/20/2017\par \par Nasal endoscopy indication: nasal polyps. Anesthesia: topical lidocaine and oxymetazoline HCl. Technique: examined with a rigid endoscope. Nasal mucosa findings: bilateral polyp(s) and bilateral nasal polyposis in middle meatus and medially, inferior nasal cavity widely patent, but the nasal mucosa was normal. Septum findings: deviated to the left and left septal spur narrowing middle meatus. Nasopharynx findings: the nasopharynx was normal. Middle Meatus findings: bilateral polyp(s). \par

## 2021-01-08 NOTE — REVIEW OF SYSTEMS
[Nl] : Genitourinary [Immunizations are up to date] : Immunizations are up to date [Received Influenza Vaccine this Past Year] : patient has received the Influenza vaccine this past year [Eye Discharge] : eye discharge [Eye Itching] : itchy eyes [Nasal Congestion] : nasal congestion [Post Nasal Drip] : post nasal drip [Sneezing] : sneezing [Eye Redness] : no redness [Puffy Eyelids] : no puffy ~T eyelids [Redness Of Eyelid] : no redness of ~T eyelid [Rhinorrhea] : no rhinorrhea [Nasal Itching] : no nasal itching [Sore Throat] : no sore throat [Hoarseness] : no hoarseness

## 2021-01-08 NOTE — PHYSICAL EXAM
[Alert] : alert [Well Nourished] : well nourished [Healthy Appearance] : healthy appearance [No Acute Distress] : no acute distress [Well Developed] : well developed [Normal Pupil & Iris Size/Symmetry] : normal pupil and iris size and symmetry [No Discharge] : no discharge [No Photophobia] : no photophobia [Sclera Not Icteric] : sclera not icteric [Normal TMs] : both tympanic membranes were normal [Normal Nasal Mucosa] : the nasal mucosa was normal [Normal Lips/Tongue] : the lips and tongue were normal [Normal Outer Ear/Nose] : the ears and nose were normal in appearance [Normal Tonsils] : normal tonsils [No Thrush] : no thrush [Pale mucosa] : pale mucosa [Supple] : the neck was supple [Normal Rate and Effort] : normal respiratory rhythm and effort [No Crackles] : no crackles [No Retractions] : no retractions [Bilateral Audible Breath Sounds] : bilateral audible breath sounds [Normal Rate] : heart rate was normal  [Normal S1, S2] : normal S1 and S2 [No murmur] : no murmur [Regular Rhythm] : with a regular rhythm [Soft] : abdomen soft [Not Tender] : non-tender [Not Distended] : not distended [No HSM] : no hepato-splenomegaly [Normal Cervical Lymph Nodes] : cervical [Skin Intact] : skin intact  [No Rash] : no rash [No Skin Lesions] : no skin lesions [No clubbing] : no clubbing [No Edema] : no edema [No Cyanosis] : no cyanosis [Normal Mood] : mood was normal [Normal Affect] : affect was normal [Alert, Awake, Oriented as Age-Appropriate] : alert, awake, oriented as age appropriate [Conjunctival Erythema] : no conjunctival erythema [Suborbital Bogginess] : no suborbital bogginess (allergic shiners) [Boggy Nasal Turbinates] : no boggy and/or pale nasal turbinates [Pharyngeal erythema] : no pharyngeal erythema [Posterior Pharyngeal Cobblestoning] : no posterior pharyngeal cobblestoning [Clear Rhinorrhea] : no clear rhinorrhea was seen [Exudate] : no exudate [Wheezing] : no wheezing was heard [Patches] : no patches

## 2021-01-08 NOTE — REASON FOR VISIT
[Initial Consultation] : an initial consultation for [FreeTextEntry2] : sinus congestion and seasonal allergies

## 2021-01-08 NOTE — END OF VISIT
[] : Fellow [FreeTextEntry3] : Chronic Rhinosinusitis with Nasal Polyps, no surgeries to date, questionable history of asthma, tolerates NSAIDs.\par

## 2021-01-10 LAB — TRYPTASE: 2.2 UG/L

## 2021-01-11 LAB — MANNAN BINDING LECTIN (MBL): 2954 NG/ML

## 2021-01-13 DIAGNOSIS — J30.9 ALLERGIC RHINITIS, UNSPECIFIED: ICD-10-CM

## 2021-01-13 DIAGNOSIS — R09.81 NASAL CONGESTION: ICD-10-CM

## 2021-01-13 DIAGNOSIS — J33.9 NASAL POLYP, UNSPECIFIED: ICD-10-CM

## 2021-01-28 ENCOUNTER — APPOINTMENT (OUTPATIENT)
Dept: OTOLARYNGOLOGY | Facility: CLINIC | Age: 36
End: 2021-01-28

## 2021-02-08 ENCOUNTER — OUTPATIENT (OUTPATIENT)
Dept: OUTPATIENT SERVICES | Facility: HOSPITAL | Age: 36
LOS: 1 days | Discharge: HOME | End: 2021-02-08

## 2021-02-08 ENCOUNTER — APPOINTMENT (OUTPATIENT)
Dept: OTOLARYNGOLOGY | Facility: CLINIC | Age: 36
End: 2021-02-08
Payer: COMMERCIAL

## 2021-02-08 VITALS — HEIGHT: 68 IN | BODY MASS INDEX: 30.31 KG/M2 | WEIGHT: 200 LBS

## 2021-02-08 PROCEDURE — 31231 NASAL ENDOSCOPY DX: CPT

## 2021-02-08 NOTE — HISTORY OF PRESENT ILLNESS
[de-identified] : 35 year old male last seen June 2018, follow up for chronic sinusitis, history of nasal polyps, surgical intervention put off at the time, prescribed Fluticasone, used with good relief.  States some nasal congestion with tightness when attempting to breathing through both nostrils.  Denies rhinorrhea, sinus pain/pressure, epistaxis, anosmia, post nasal drip, recent fevers and/or sinus infections.  Seen by Allergist.  He states that since the summer he seems to be having more symptoms and was concerned about the polyps that are growing back and causing obstruction.

## 2021-02-08 NOTE — PROCEDURE
[FreeTextEntry6] : Nasal Endoscopy (34596)\par \par After informed verbal consent, nasal endoscopy was performed due to anterior rhinoscopy insufficient to account for symptoms.  Flexible ], scope # [] was used.  Topical vasoconstrictor and anesthetic was used.  The exam showed a deviated septum to left \par \par The nasal endoscope is passed via the right nasal cavity. The inferior, middle, and superior turbinates responded to vasoconstrictors. The inferior, middle and superior meati were examined. The osteomeatal complex is clear with small nonobstructing polyposis . The sphenoethmoidal recess is clear with no polyposis or purulence. The choana is patent. \par \par The nasal endoscope is passed via the left nasal cavity. The inferior, middle, and superior turbinates responded to vasoconstrictors. The inferior, middle and superior meati were examined. The osteomeatal complex is clear with nonobstructing polyposis. The sphenoethmoidal recess is clear with no polyposis or purulence. The choana is patent.  \par \par There is []% obstruction of the nasopharynx with adenoid tissue.\par \par

## 2021-02-08 NOTE — REASON FOR VISIT
[Subsequent Evaluation] : a subsequent evaluation for [Other: _____] : [unfilled] [FreeTextEntry2] : last seen June 2018, follow up for chronic sinusitis

## 2021-02-11 DIAGNOSIS — J30.9 ALLERGIC RHINITIS, UNSPECIFIED: ICD-10-CM

## 2021-02-11 DIAGNOSIS — J33.9 NASAL POLYP, UNSPECIFIED: ICD-10-CM

## 2021-02-11 LAB — LEUKOTRIENE E4, URINE: NORMAL

## 2021-03-31 ENCOUNTER — APPOINTMENT (OUTPATIENT)
Dept: PEDIATRIC ALLERGY IMMUNOLOGY | Facility: CLINIC | Age: 36
End: 2021-03-31
Payer: COMMERCIAL

## 2021-03-31 VITALS
OXYGEN SATURATION: 96 % | DIASTOLIC BLOOD PRESSURE: 81 MMHG | TEMPERATURE: 97.3 F | WEIGHT: 205 LBS | SYSTOLIC BLOOD PRESSURE: 145 MMHG | BODY MASS INDEX: 31.17 KG/M2 | HEART RATE: 71 BPM

## 2021-03-31 PROCEDURE — 99213 OFFICE O/P EST LOW 20 MIN: CPT

## 2021-04-01 NOTE — HISTORY OF PRESENT ILLNESS
[de-identified] : DAVID GUNDERSON is a 35 year old male with history of chronic sinusitis with nasal polyposis, allergic rhinitis and  food allergies, returns for a follow up.\par \par - He saw Dr Callejas 2/2021- small nonobstructive polyposis on the right\par - Sinonasal outcome test (SNOT 22) -15\par \par - Labs: no peripheral eosinophilia, IgE-729, N urine LTE4\par \par = Chronic Rhinosinusitis with Nasal Polyps, never had sinus surgeries\par = was evaluated by pulmonary in the past, no asthma symptoms, previously N spirometry, no history of allergic reactions to NSAIDs\par - No history of reactions to NSAIDs\par = multiple environmental allergies\par \par HISTORY:\par Pt was previously known to [facility], but was lost to follow up. He recently re-established primary care and was referred by Dr Marsh ([facility]). He reports worsening symptoms of congestion, sneezing, facial pressure and post nasal drip for the past few months. He was previously seen by A/I (Dr Burks) in 2016, and was found to have environmental allergies, as well as food allergies to shellfish, white fish, salmon, strawberry, squash and kiwi. He reports that in general, his sinus symptoms are worse in the summer season and associated with itchy/watery eyes.\par \par History of nasal polyposis:\par Nasal polyposis was previously noted on nasal endoscopy in 2017 (see report below). He has had 0 sinus surgeries. Over the last year he received 2 courses of antibiotics and steroids. He last saw ENT in 2018, and he was offered surgical resection of nasal polyps, but declined and decided to proceed with medical management. He reports that his symptoms were controlled on fluticasone 2 sprays BID. With recent worsening of symptoms, he tried Claritin, which did not help his symptoms. He also tried Benadryl which made him drowsy. He has since stopped taking antihistamines.  His sense of smell is fine and sense of taste is fine.\par \par History of asthma:\par He previously followed with pulmonary for asthma (per last note in 2018, he had normal PFTs and was discharged from their clinic); he denies any asthma symptoms and he no longer follows with them or uses any inhalers.\par \par History of NSAID allergy:\par He does not use NSAIDs. He does not use aspirin. He denies history of worsening sinus symptoms or dyspnea after NSAID use (has not used them). He denies history of respiratory symptoms after alcohol ingestion.\par \par Sinonasal outcome test (SNOT 22) - 11

## 2021-04-01 NOTE — REASON FOR VISIT
[Routine Follow-Up] : a routine follow-up visit for [FreeTextEntry2] : Chronic Rhinosinusitis with Nasal Polyps

## 2021-04-01 NOTE — REVIEW OF SYSTEMS
[Fatigue] : no fatigue [Fever] : no fever [Recurrent Sinus Infections] : recurrent sinus infections [Eye Discharge] : eye discharge [Eye Redness] : no redness [Eye Itching] : itchy eyes [Puffy Eyelids] : no puffy ~T eyelids [Redness Of Eyelid] : no redness of ~T eyelid [Rhinorrhea] : no rhinorrhea [Nasal Congestion] : nasal congestion [Nasal Itching] : no nasal itching [Sore Throat] : no sore throat [Hoarseness] : no hoarseness [Post Nasal Drip] : post nasal drip [Sneezing] : sneezing [Nl] : Genitourinary

## 2021-04-01 NOTE — PHYSICAL EXAM
[Alert] : alert [Well Nourished] : well nourished [Healthy Appearance] : healthy appearance [No Acute Distress] : no acute distress [Well Developed] : well developed [No Discharge] : no discharge [No Photophobia] : no photophobia [Sclera Not Icteric] : sclera not icteric [Conjunctival Erythema] : no conjunctival erythema [Suborbital Bogginess] : no suborbital bogginess (allergic shiners) [Pale mucosa] : pale mucosa [Boggy Nasal Turbinates] : boggy and/or pale nasal turbinates [Pharyngeal erythema] : no pharyngeal erythema [Posterior Pharyngeal Cobblestoning] : posterior pharyngeal cobblestoning [Exudate] : no exudate [Supple] : the neck was supple [Normal Rate and Effort] : normal respiratory rhythm and effort [No Crackles] : no crackles [Bilateral Audible Breath Sounds] : bilateral audible breath sounds [Wheezing] : no wheezing was heard [Normal Rate] : heart rate was normal  [Normal S1, S2] : normal S1 and S2 [Regular Rhythm] : with a regular rhythm [Soft] : abdomen soft [No HSM] : no hepato-splenomegaly [Normal Cervical Lymph Nodes] : cervical [No Rash] : no rash [No Skin Lesions] : no skin lesions [Patches] : no patches [No clubbing] : no clubbing [No Edema] : no edema [No Cyanosis] : no cyanosis [Normal Mood] : mood was normal [Normal Affect] : affect was normal [Alert, Awake, Oriented as Age-Appropriate] : alert, awake, oriented as age appropriate

## 2021-05-26 ENCOUNTER — APPOINTMENT (OUTPATIENT)
Dept: PEDIATRIC ALLERGY IMMUNOLOGY | Facility: CLINIC | Age: 36
End: 2021-05-26
Payer: COMMERCIAL

## 2021-05-26 ENCOUNTER — OUTPATIENT (OUTPATIENT)
Dept: OUTPATIENT SERVICES | Facility: HOSPITAL | Age: 36
LOS: 1 days | End: 2021-05-26
Payer: SELF-PAY

## 2021-05-26 VITALS
DIASTOLIC BLOOD PRESSURE: 81 MMHG | BODY MASS INDEX: 31.22 KG/M2 | SYSTOLIC BLOOD PRESSURE: 155 MMHG | HEART RATE: 76 BPM | TEMPERATURE: 96.3 F | OXYGEN SATURATION: 96 % | HEIGHT: 68 IN | WEIGHT: 206 LBS

## 2021-05-26 DIAGNOSIS — J33.9 NASAL POLYP, UNSPECIFIED: ICD-10-CM

## 2021-05-26 DIAGNOSIS — J30.89 OTHER ALLERGIC RHINITIS: ICD-10-CM

## 2021-05-26 DIAGNOSIS — J30.1 ALLERGIC RHINITIS DUE TO POLLEN: ICD-10-CM

## 2021-05-26 DIAGNOSIS — J32.9 CHRONIC SINUSITIS, UNSPECIFIED: ICD-10-CM

## 2021-05-26 DIAGNOSIS — J30.81 ALLERGIC RHINITIS DUE TO ANIMAL (CAT) (DOG) HAIR AND DANDER: ICD-10-CM

## 2021-05-26 PROCEDURE — G0463: CPT

## 2021-05-26 PROCEDURE — 99213 OFFICE O/P EST LOW 20 MIN: CPT | Mod: NC

## 2021-05-26 NOTE — HISTORY OF PRESENT ILLNESS
[de-identified] : DAVID GUNDERSON is a 35 year old male with history of chronic sinusitis with nasal polyposis, allergic rhinitis and  food allergies, returns for a follow up.\par \par - He saw Dr Callejas 2/2021- small nonobstructive polyposis on the right\par \par - 5/26/21- Sinonasal outcome test (SNOT 22) - 18\par - 3/31/2021-  Sinonasal outcome test (SNOT 22) -15\par \par - Labs: no peripheral eosinophilia, IgE-729, N urine LTE4\par \par = Chronic Rhinosinusitis with Nasal Polyps, never had sinus surgeries\par = was evaluated by pulmonary in the past, no asthma symptoms, previously N spirometry, no history of allergic reactions to NSAIDs\par - No history of reactions to NSAIDs and no asthma\par = multiple environmental allergies: skin testing 1/2021 - positive to dust mites, feathers, cockroach, dog, trees and weeds. \par \par - Labs 1/2021:\par --- Absolute eosinophil count - 320\par --- IgE-729\par --- LTE-127\par --- Normal complements and quantitative immunoglobulins \par \par \par \par HISTORY:\par Pt was previously known to [facility], but was lost to follow up. He recently re-established primary care and was referred by Dr Marsh ([facility] Internal [facility]). He reports worsening symptoms of congestion, sneezing, facial pressure and post nasal drip for the past few months. He was previously seen by A/I (Dr Burks) in 2016, and was found to have environmental allergies, as well as food allergies to shellfish, white fish, salmon, strawberry, squash and kiwi. He reports that in general, his sinus symptoms are worse in the summer season and associated with itchy/watery eyes.\par \par History of nasal polyposis:\par Nasal polyposis was previously noted on nasal endoscopy in 2017 (see report below). He has had 0 sinus surgeries. Over the last year he received 2 courses of antibiotics and steroids. He last saw ENT in 2018, and he was offered surgical resection of nasal polyps, but declined and decided to proceed with medical management. He reports that his symptoms were controlled on fluticasone 2 sprays BID. With recent worsening of symptoms, he tried Claritin, which did not help his symptoms. He also tried Benadryl which made him drowsy. He has since stopped taking antihistamines.  His sense of smell is fine and sense of taste is fine.\par \par History of asthma:\par He previously followed with pulmonary for asthma (per last note in 2018, he had normal PFTs and was discharged from their clinic); he denies any asthma symptoms and he no longer follows with them or uses any inhalers.\par \par History of NSAID allergy:\par He does not use NSAIDs. He does not use aspirin. He denies history of worsening sinus symptoms or dyspnea after NSAID use (has not used them). He denies history of respiratory symptoms after alcohol ingestion.\par \par Sinonasal outcome test (SNOT 22) - 11

## 2021-05-26 NOTE — REVIEW OF SYSTEMS
[Rhinorrhea] : rhinorrhea [Nasal Congestion] : nasal congestion [Post Nasal Drip] : post nasal drip [Recurrent Sinus Infections] : recurrent sinus infections [Nl] : Integumentary [Fatigue] : no fatigue [Fever] : no fever

## 2021-05-26 NOTE — PHYSICAL EXAM
[Alert] : alert [Well Nourished] : well nourished [Healthy Appearance] : healthy appearance [No Acute Distress] : no acute distress [No Discharge] : no discharge [No Photophobia] : no photophobia [Sclera Not Icteric] : sclera not icteric [Pale mucosa] : pale mucosa [Boggy Nasal Turbinates] : boggy and/or pale nasal turbinates [Posterior Pharyngeal Cobblestoning] : posterior pharyngeal cobblestoning [Clear Rhinorrhea] : clear rhinorrhea was seen [Supple] : the neck was supple [Normal Rate and Effort] : normal respiratory rhythm and effort [No Crackles] : no crackles [Bilateral Audible Breath Sounds] : bilateral audible breath sounds [Normal Rate] : heart rate was normal  [Regular Rhythm] : with a regular rhythm [Soft] : abdomen soft [Not Tender] : non-tender [Normal Cervical Lymph Nodes] : cervical [No Rash] : no rash [No clubbing] : no clubbing [No Cyanosis] : no cyanosis [Normal Mood] : mood was normal [Normal Affect] : affect was normal [Alert, Awake, Oriented as Age-Appropriate] : alert, awake, oriented as age appropriate [Conjunctival Erythema] : no conjunctival erythema [Suborbital Bogginess] : no suborbital bogginess (allergic shiners) [Pharyngeal erythema] : no pharyngeal erythema [Exudate] : no exudate [Wheezing] : no wheezing was heard [Patches] : no patches

## 2021-10-15 DIAGNOSIS — J30.9 ALLERGIC RHINITIS, UNSPECIFIED: ICD-10-CM

## 2022-03-04 ENCOUNTER — OUTPATIENT (OUTPATIENT)
Dept: OUTPATIENT SERVICES | Facility: HOSPITAL | Age: 37
LOS: 1 days | End: 2022-03-04
Payer: SELF-PAY

## 2022-03-04 ENCOUNTER — APPOINTMENT (OUTPATIENT)
Dept: INTERNAL MEDICINE | Facility: CLINIC | Age: 37
End: 2022-03-04
Payer: COMMERCIAL

## 2022-03-04 VITALS
HEIGHT: 68 IN | HEART RATE: 74 BPM | SYSTOLIC BLOOD PRESSURE: 110 MMHG | BODY MASS INDEX: 31.67 KG/M2 | OXYGEN SATURATION: 98 % | DIASTOLIC BLOOD PRESSURE: 78 MMHG | WEIGHT: 209 LBS

## 2022-03-04 DIAGNOSIS — I10 ESSENTIAL (PRIMARY) HYPERTENSION: ICD-10-CM

## 2022-03-04 PROCEDURE — ZZZZZ: CPT

## 2022-03-04 PROCEDURE — G0463: CPT

## 2022-03-04 PROCEDURE — T1013: CPT

## 2022-03-04 RX ORDER — FLUTICASONE PROPIONATE 50 UG/1
50 SPRAY, METERED NASAL TWICE DAILY
Qty: 1 | Refills: 1 | Status: ACTIVE | COMMUNITY
Start: 2018-06-18 | End: 1900-01-01

## 2022-03-04 RX ORDER — AZELASTINE HYDROCHLORIDE 137 UG/1
0.1 SPRAY, METERED NASAL TWICE DAILY
Qty: 1 | Refills: 1 | Status: ACTIVE | COMMUNITY
Start: 2021-03-31 | End: 1900-01-01

## 2022-03-04 NOTE — ASSESSMENT
[FreeTextEntry1] : 36M chronic sinusitis, allergic rhinitis, and nasal polyps presents for follow-up.\par \par #Allergic rhinitis\par -C/w flonase, Allegra, azelastine\par -F/u allergy\par \par HCM\par -PHQ2 neg \par \par RTC after seeing allergy - needs CPE\par \par

## 2022-03-04 NOTE — HISTORY OF PRESENT ILLNESS
[[employer] ] : provided by [employer]   [Time Spent: ____ minutes] : Total time spent using  services: [unfilled] minutes. The patient's primary language is not English thus required  services. [Interpreters_IDNumber] : [id number] [de-identified] : 36M chronic sinusitis, allergic rhinitis, and nasal polyps presents for follow-up.\par \par Sinusitis improved with flonase. Saw ENT - not recommended for surgery for small polyps. He has multiple allergies for which he seen Allergy. He was prescribed intranasal steroid (flonase) and second generation antihistamine (allegra) as ordered, as well as Azelastine nasal spray as recommended. Last visit with Allergy, he also expressed interest in allergen immunotherapy, but never followed up afterwards. Reports taking allegra and flonase but not Azelastine as he did not have it. Still having significant congestion.\par \par \par

## 2022-03-14 DIAGNOSIS — J30.9 ALLERGIC RHINITIS, UNSPECIFIED: ICD-10-CM

## 2022-06-17 ENCOUNTER — APPOINTMENT (OUTPATIENT)
Dept: PEDIATRIC ALLERGY IMMUNOLOGY | Facility: CLINIC | Age: 37
End: 2022-06-17
Payer: SELF-PAY

## 2022-06-17 ENCOUNTER — OUTPATIENT (OUTPATIENT)
Dept: OUTPATIENT SERVICES | Facility: HOSPITAL | Age: 37
LOS: 1 days | End: 2022-06-17
Payer: SELF-PAY

## 2022-06-17 ENCOUNTER — LABORATORY RESULT (OUTPATIENT)
Age: 37
End: 2022-06-17

## 2022-06-17 ENCOUNTER — APPOINTMENT (OUTPATIENT)
Dept: INTERNAL MEDICINE | Facility: CLINIC | Age: 37
End: 2022-06-17

## 2022-06-17 VITALS
SYSTOLIC BLOOD PRESSURE: 132 MMHG | BODY MASS INDEX: 32.43 KG/M2 | WEIGHT: 214 LBS | HEIGHT: 68 IN | OXYGEN SATURATION: 98 % | DIASTOLIC BLOOD PRESSURE: 80 MMHG | HEART RATE: 71 BPM

## 2022-06-17 VITALS — SYSTOLIC BLOOD PRESSURE: 118 MMHG | DIASTOLIC BLOOD PRESSURE: 70 MMHG

## 2022-06-17 VITALS — WEIGHT: 215.19 LBS | OXYGEN SATURATION: 95 % | HEART RATE: 78 BPM | BODY MASS INDEX: 32.72 KG/M2

## 2022-06-17 DIAGNOSIS — Z00.00 ENCOUNTER FOR GENERAL ADULT MEDICAL EXAMINATION W/OUT ABNORMAL FINDINGS: ICD-10-CM

## 2022-06-17 DIAGNOSIS — J30.9 ALLERGIC RHINITIS, UNSPECIFIED: ICD-10-CM

## 2022-06-17 DIAGNOSIS — R73.03 PREDIABETES: ICD-10-CM

## 2022-06-17 DIAGNOSIS — J30.1 ALLERGIC RHINITIS DUE TO POLLEN: ICD-10-CM

## 2022-06-17 DIAGNOSIS — Z00.00 ENCOUNTER FOR GENERAL ADULT MEDICAL EXAMINATION WITHOUT ABNORMAL FINDINGS: ICD-10-CM

## 2022-06-17 DIAGNOSIS — D75.1 SECONDARY POLYCYTHEMIA: ICD-10-CM

## 2022-06-17 DIAGNOSIS — Z91.018 ALLERGY TO OTHER FOODS: ICD-10-CM

## 2022-06-17 DIAGNOSIS — I10 ESSENTIAL (PRIMARY) HYPERTENSION: ICD-10-CM

## 2022-06-17 DIAGNOSIS — J30.89 OTHER ALLERGIC RHINITIS: ICD-10-CM

## 2022-06-17 DIAGNOSIS — J32.9 CHRONIC SINUSITIS, UNSPECIFIED: ICD-10-CM

## 2022-06-17 DIAGNOSIS — J30.81 ALLERGIC RHINITIS DUE TO ANIMAL (CAT) (DOG) HAIR AND DANDER: ICD-10-CM

## 2022-06-17 PROCEDURE — 80061 LIPID PANEL: CPT

## 2022-06-17 PROCEDURE — 81270 JAK2 GENE: CPT

## 2022-06-17 PROCEDURE — 86705 HEP B CORE ANTIBODY IGM: CPT

## 2022-06-17 PROCEDURE — 86803 HEPATITIS C AB TEST: CPT

## 2022-06-17 PROCEDURE — 82728 ASSAY OF FERRITIN: CPT

## 2022-06-17 PROCEDURE — 36415 COLL VENOUS BLD VENIPUNCTURE: CPT

## 2022-06-17 PROCEDURE — 83036 HEMOGLOBIN GLYCOSYLATED A1C: CPT

## 2022-06-17 PROCEDURE — G0463: CPT

## 2022-06-17 PROCEDURE — 86709 HEPATITIS A IGM ANTIBODY: CPT

## 2022-06-17 PROCEDURE — 87350 HEPATITIS BE AG IA: CPT

## 2022-06-17 PROCEDURE — 85025 COMPLETE CBC W/AUTO DIFF WBC: CPT

## 2022-06-17 PROCEDURE — G0463: CPT | Mod: 25

## 2022-06-17 PROCEDURE — 86707 HEPATITIS BE ANTIBODY: CPT

## 2022-06-17 PROCEDURE — 83550 IRON BINDING TEST: CPT

## 2022-06-17 PROCEDURE — 86706 HEP B SURFACE ANTIBODY: CPT

## 2022-06-17 PROCEDURE — 99214 OFFICE O/P EST MOD 30 MIN: CPT

## 2022-06-17 PROCEDURE — 82525 ASSAY OF COPPER: CPT

## 2022-06-17 PROCEDURE — ZZZZZ: CPT

## 2022-06-17 PROCEDURE — 95004 PERQ TESTS W/ALRGNC XTRCS: CPT

## 2022-06-17 PROCEDURE — 80053 COMPREHEN METABOLIC PANEL: CPT

## 2022-06-17 RX ORDER — EPINEPHRINE 0.3 MG/.3ML
0.3 INJECTION INTRAMUSCULAR
Qty: 2 | Refills: 3 | Status: ACTIVE | COMMUNITY
Start: 2022-06-17 | End: 1900-01-01

## 2022-06-17 RX ORDER — AZELASTINE HYDROCHLORIDE 137 UG/1
137 SPRAY, METERED NASAL TWICE DAILY
Qty: 1 | Refills: 3 | Status: ACTIVE | COMMUNITY
Start: 2022-06-17 | End: 1900-01-01

## 2022-06-17 RX ORDER — FEXOFENADINE HYDROCHLORIDE 180 MG/1
180 TABLET ORAL DAILY
Qty: 1 | Refills: 11 | Status: ACTIVE | COMMUNITY
Start: 2021-03-31 | End: 1900-01-01

## 2022-06-17 NOTE — PHYSICAL EXAM
[Well Nourished] : well nourished [Well Developed] : well developed [Well-Appearing] : well-appearing [Normal Sclera/Conjunctiva] : normal sclera/conjunctiva [PERRL] : pupils equal round and reactive to light [EOMI] : extraocular movements intact [Normal Outer Ear/Nose] : the outer ears and nose were normal in appearance [Normal Oropharynx] : the oropharynx was normal [No JVD] : no jugular venous distention [No Lymphadenopathy] : no lymphadenopathy [Supple] : supple [No Respiratory Distress] : no respiratory distress  [No Accessory Muscle Use] : no accessory muscle use [Clear to Auscultation] : lungs were clear to auscultation bilaterally [Normal Rate] : normal rate  [Regular Rhythm] : with a regular rhythm [Normal S1, S2] : normal S1 and S2 [No Murmur] : no murmur heard [Soft] : abdomen soft [Non Tender] : non-tender [Non-distended] : non-distended [No Masses] : no abdominal mass palpated [No HSM] : no HSM [Normal Bowel Sounds] : normal bowel sounds [No Joint Swelling] : no joint swelling [No Rash] : no rash [Coordination Grossly Intact] : coordination grossly intact [No Focal Deficits] : no focal deficits [Normal Gait] : normal gait

## 2022-06-17 NOTE — REASON FOR VISIT
[Routine Follow-Up] : a routine follow-up visit for [[employer] ] : provided by [employer]   [FreeTextEntry2] : Chronic Rhinosinusitis with Nasal Polyps  [TWNoteComboBox1] : Lithuanian

## 2022-06-17 NOTE — PHYSICAL EXAM
[Alert] : alert [Well Nourished] : well nourished [Healthy Appearance] : healthy appearance [No Acute Distress] : no acute distress [Well Developed] : well developed [Normal Pupil & Iris Size/Symmetry] : normal pupil and iris size and symmetry [No Discharge] : no discharge [No Photophobia] : no photophobia [Sclera Not Icteric] : sclera not icteric [Normal TMs] : both tympanic membranes were normal [Normal Lips/Tongue] : the lips and tongue were normal [Normal Outer Ear/Nose] : the ears and nose were normal in appearance [Normal Tonsils] : normal tonsils [No Thrush] : no thrush [Supple] : the neck was supple [Normal Rate and Effort] : normal respiratory rhythm and effort [No Crackles] : no crackles [No Retractions] : no retractions [Bilateral Audible Breath Sounds] : bilateral audible breath sounds [Normal Rate] : heart rate was normal  [Normal S1, S2] : normal S1 and S2 [No murmur] : no murmur [Regular Rhythm] : with a regular rhythm [Soft] : abdomen soft [Not Tender] : non-tender [Not Distended] : not distended [No HSM] : no hepato-splenomegaly [Normal Cervical Lymph Nodes] : cervical [Skin Intact] : skin intact  [No Rash] : no rash [No Skin Lesions] : no skin lesions [No clubbing] : no clubbing [No Edema] : no edema [No Cyanosis] : no cyanosis [Normal Mood] : mood was normal [Normal Affect] : affect was normal [Alert, Awake, Oriented as Age-Appropriate] : alert, awake, oriented as age appropriate [Boggy Nasal Turbinates] : boggy and/or pale nasal turbinates [Clear Rhinorrhea] : clear rhinorrhea was seen [Conjunctival Erythema] : no conjunctival erythema [Suborbital Bogginess] : no suborbital bogginess (allergic shiners) [Pale mucosa] : no pale mucosa [Pharyngeal erythema] : no pharyngeal erythema [Posterior Pharyngeal Cobblestoning] : no posterior pharyngeal cobblestoning [Exudate] : no exudate [Wheezing] : no wheezing was heard [Patches] : no patches [de-identified] : b/l nasal turb hypertrophy

## 2022-06-17 NOTE — REVIEW OF SYSTEMS
[Nl] : Genitourinary [Nasal Congestion] : nasal congestion [SOB at Rest] : shortness of breath at rest [SOB with Exertion] : no dyspnea on exertion [Cough] : no cough [Wheezing Worsens With Exercise] : wheezing does not worsen with exercise [Wheezing Worse During Cold Weather] : wheezing not ~L worse during cold weather [Wheezing] : no wheezing

## 2022-06-17 NOTE — HISTORY OF PRESENT ILLNESS
[de-identified] : DAVID GUNDERSON is a 36 year old  male with history of chronic sinusitis with nasal polyposis, seasonal and perennial allergic rhinitis and multiple food allergies (shellfish, white fish, salmon, strawberry, squash and kiwi) who returns for a follow up.\par \par Interval history:\par Previously he followed with Dr Canales, however after AERD was ruled out, he was lost to follow up. Since last visit, AIT was mixed for him in fall 2021, however he never followed up to initiate AIT. He reports that he was not contacted by the office and did not know that his AIT was ready to start.\par \par CRSwNP and Allergic rhinitis:\par Since his last visit, he reports his nasal and sinus symptoms are a little bit better overall but his symptoms fluctuate with seasons.  Lately, he reports intermittent symptoms of dyspnea, nasal congestion occurring several times weekly. Today, he reports that 1/10 in severity, but today is much better than normal. He reports good sense of taste and smell. He last saw ENT about 1 year prior and although he has small NPs, they are not planning any additional sinus surgeries at this time. \par \par He has continued on regimen of Flonase 1puff BID and Allegra OD. He was also prescribed Azelastine but is not taking it, as he reports that he did not have the prescription at his pharmacy. We discussed medical management versus starting AIT. He would like to titrate medications at this time prior to starting AIT.\par \par Food allergy: \par He has history of food allergies with shellfish, white fish, salmon, strawberry, squash and kiwi. He has not had any food related allergic reactions since his last visit. He is strictly avoiding these foods (shellfish, fish, salmon, strawberry, squash and kiwi). He does not have an unexpired Epipen. He last took Allegra 3 days.\par \par \par Prior history (seen by Dr Canales on 5/26/21):\par - He saw Dr Callejas 2/2021- small nonobstructive polyposis on the right\par \par - 5/26/21- Sinonasal outcome test (SNOT 22) - 18\par - 3/31/2021-  Sinonasal outcome test (SNOT 22) -15\par \par - Labs: no peripheral eosinophilia, IgE-729, N urine LTE4\par \par = Chronic Rhinosinusitis with Nasal Polyps, never had sinus surgeries\par = was evaluated by pulmonary in the past, no asthma symptoms, previously N spirometry, no history of allergic reactions to NSAIDs\par - No history of reactions to NSAIDs and no asthma\par = multiple environmental allergies: skin testing 1/2021 - positive to dust mites, feathers, cockroach, dog, trees and weeds. \par \par - Labs 1/2021:\par --- Absolute eosinophil count - 320\par --- IgE-729\par --- LTE-127\par --- Normal complements and quantitative immunoglobulins \par \par \par \par HISTORY:\par Pt was previously known to [facility], but was lost to follow up. He recently re-established primary care and was referred by Dr Marsh ([facility] Internal med). He reports worsening symptoms of congestion, sneezing, facial pressure and post nasal drip for the past few months. He was previously seen by [facility]/I (Dr Burks) in 2016, and was found to have environmental allergies, as well as food allergies to shellfish, white fish, salmon, strawberry, squash and kiwi. He reports that in general, his sinus symptoms are worse in the summer season and associated with itchy/watery eyes.\par \par History of nasal polyposis:\par Nasal polyposis was previously noted on nasal endoscopy in 2017 (see report below). He has had 0 sinus surgeries. Over the last year he received 2 courses of antibiotics and steroids. He last saw ENT in 2018, and he was offered surgical resection of nasal polyps, but declined and decided to proceed with medical management. He reports that his symptoms were controlled on fluticasone 2 sprays BID. With recent worsening of symptoms, he tried Claritin, which did not help his symptoms. He also tried Benadryl which made him drowsy. He has since stopped taking antihistamines.  His sense of smell is fine and sense of taste is fine.\par \par History of asthma:\par He previously followed with pulmonary for asthma (per last note in 2018, he had normal PFTs and was discharged from their clinic); he denies any asthma symptoms and he no longer follows with them or uses any inhalers.\par \par History of NSAID allergy:\par He does not use NSAIDs. He does not use aspirin. He denies history of worsening sinus symptoms or dyspnea after NSAID use (has not used them). He denies history of respiratory symptoms after alcohol ingestion.\par \par Sinonasal outcome test (SNOT 22) - 11\par

## 2022-06-17 NOTE — CONSULT LETTER
[Dear  ___] : Dear  [unfilled], [Consult Letter:] : I had the pleasure of evaluating your patient, [unfilled]. [Please see my note below.] : Please see my note below. [This report is provisional, pending the completion of the evaluation.  A final diagnosis and plan will follow.] : This report is provisional, pending the completion of the evaluation.  A final diagnosis and plan will follow. [Consult Closing:] : Thank you very much for allowing me to participate in the care of this patient.  If you have any questions, please do not hesitate to contact me. [Sincerely,] : Sincerely, [FreeTextEntry2] : ELIZABETH CURTIS [FreeTextEntry3] : Benji Jackson MD\par ___________________________________\par Fellow, Division of Allergy and Immunology\par [facility]\par [facility]\par \par \par Alistair Burrell III  MPH, MD, PhD, FACP, FACAAI, FAAAAI \par , Departments of Medicine and Pediatrics \par [facility] \par [facility], [facility] \par Attending Physician, Division of Allergy & Immunology [facility]\par \par \par \par \par

## 2022-06-20 DIAGNOSIS — R09.81 NASAL CONGESTION: ICD-10-CM

## 2022-06-20 DIAGNOSIS — J32.9 CHRONIC SINUSITIS, UNSPECIFIED: ICD-10-CM

## 2022-06-20 DIAGNOSIS — J30.89 OTHER ALLERGIC RHINITIS: ICD-10-CM

## 2022-06-20 DIAGNOSIS — J33.9 NASAL POLYP, UNSPECIFIED: ICD-10-CM

## 2022-06-20 DIAGNOSIS — J30.1 ALLERGIC RHINITIS DUE TO POLLEN: ICD-10-CM

## 2022-06-20 DIAGNOSIS — Z91.018 ALLERGY TO OTHER FOODS: ICD-10-CM

## 2022-06-20 DIAGNOSIS — J30.81 ALLERGIC RHINITIS DUE TO ANIMAL (CAT) (DOG) HAIR AND DANDER: ICD-10-CM

## 2022-06-21 LAB
BLUE MUSSEL IGE QN: 0.14 KUA/L
CLAM IGE QN: 0.6 KUA/L
CODFISH IGE QN: <0.1 KUA/L
CRAB IGE QN: 1.43 KUA/L
DEPRECATED BLUE MUSSEL IGE RAST QL: NORMAL
DEPRECATED CLAM IGE RAST QL: 1
DEPRECATED CODFISH IGE RAST QL: 0
DEPRECATED CRAB IGE RAST QL: 2
DEPRECATED FLOUNDER IGE RAST QL: 0
DEPRECATED HALIBUT IGE RAST QL: NORMAL
DEPRECATED KIWIFRUIT IGE RAST QL: 0.57 KUA/L
DEPRECATED LOBSTER IGE RAST QL: 2
DEPRECATED OYSTER IGE RAST QL: NORMAL
DEPRECATED PUMPKIN IGE RAST QL: 2
DEPRECATED SALMON IGE RAST QL: 0
DEPRECATED SCALLOP IGE RAST QL: 0.39 KUA/L
DEPRECATED SHRIMP IGE RAST QL: 2
DEPRECATED STRAWBERRY IGE RAST QL: NORMAL
DEPRECATED TILAPIA IGE RAST QL: 0
FLOUNDER IGE QN: <0.1 KUA/L
HALIBUT IGE QN: 0.11 KUA/L
KIWIFRUIT IGE QN: 1
LOBSTER IGE QN: 1.14 KUA/L
OYSTER IGE QN: 0.22 KUA/L
PUMPKIN IGE QN: 1.64 KUA/L
SALMON IGE QN: <0.1 KUA/L
SCALLOP IGE QN: 1
SCALLOP IGE QN: 1.88 KUA/L
STRAWBERRY IGE QN: 0.1 KUA/L
TILAPIA IGE QN: <0.1 KUA/L

## 2022-06-22 ENCOUNTER — NON-APPOINTMENT (OUTPATIENT)
Age: 37
End: 2022-06-22

## 2022-06-22 LAB
ALBUMIN SERPL ELPH-MCNC: 4.8 G/DL
ALP BLD-CCNC: 95 U/L
ALT SERPL-CCNC: 109 U/L
ANION GAP SERPL CALC-SCNC: 25 MMOL/L
AST SERPL-CCNC: 92 U/L
BASOPHILS # BLD AUTO: 0.08 K/UL
BASOPHILS NFR BLD AUTO: 0.7 %
BILIRUB SERPL-MCNC: 1.2 MG/DL
BUN SERPL-MCNC: 12 MG/DL
CALCIUM SERPL-MCNC: 9.6 MG/DL
CHLORIDE SERPL-SCNC: 98 MMOL/L
CHOLEST SERPL-MCNC: 203 MG/DL
CO2 SERPL-SCNC: 16 MMOL/L
COPPER SERPL-MCNC: 111 UG/DL
CREAT SERPL-MCNC: 0.81 MG/DL
EGFR: 98 ML/MIN/1.73M2
EOSINOPHIL # BLD AUTO: 0.74 K/UL
EOSINOPHIL NFR BLD AUTO: 6.5 %
ESTIMATED AVERAGE GLUCOSE: 143 MG/DL
FERRITIN SERPL-MCNC: 990 NG/ML
GLUCOSE SERPL-MCNC: 93 MG/DL
HAV IGM SER QL: NONREACTIVE
HBA1C MFR BLD HPLC: 6.6 %
HBV CORE IGM SER QL: NONREACTIVE
HBV E AB SER QL: NONREACTIVE
HBV E AG SER QL: NONREACTIVE
HBV SURFACE AB SER QL: NONREACTIVE
HCT VFR BLD CALC: 47.2 %
HCV AB SER QL: NONREACTIVE
HCV S/CO RATIO: 0.2 S/CO
HDLC SERPL-MCNC: 37 MG/DL
HGB BLD-MCNC: 16.3 G/DL
IMM GRANULOCYTES NFR BLD AUTO: 0.4 %
IRON SATN MFR SERPL: 76 %
IRON SERPL-MCNC: 224 UG/DL
LDLC SERPL CALC-MCNC: 120 MG/DL
LYMPHOCYTES # BLD AUTO: 3.97 K/UL
LYMPHOCYTES NFR BLD AUTO: 35 %
MAN DIFF?: NORMAL
MCHC RBC-ENTMCNC: 31.5 PG
MCHC RBC-ENTMCNC: 34.5 GM/DL
MCV RBC AUTO: 91.1 FL
MONOCYTES # BLD AUTO: 0.69 K/UL
MONOCYTES NFR BLD AUTO: 6.1 %
NEUTROPHILS # BLD AUTO: 5.81 K/UL
NEUTROPHILS NFR BLD AUTO: 51.3 %
NONHDLC SERPL-MCNC: 166 MG/DL
PLATELET # BLD AUTO: 203 K/UL
POTASSIUM SERPL-SCNC: 4.7 MMOL/L
PROT SERPL-MCNC: 7.6 G/DL
RBC # BLD: 5.18 M/UL
RBC # FLD: 12.4 %
SODIUM SERPL-SCNC: 139 MMOL/L
TIBC SERPL-MCNC: 295 UG/DL
TRIGL SERPL-MCNC: 230 MG/DL
UIBC SERPL-MCNC: 71 UG/DL
WBC # FLD AUTO: 11.33 K/UL

## 2022-06-30 LAB
EXTRACTION: NORMAL
JAK2 P.V617F BLD/T QL: NORMAL
LAB DIRECTOR NAME PROVIDER: NORMAL
LABORATORY COMMENT REPORT: NORMAL
REFLEX:: NORMAL

## 2022-07-01 NOTE — HEALTH RISK ASSESSMENT
[Good] : ~his/her~  mood as  good [Current] : Current [Yes] : Yes [1 or 2 (0 pts)] : 1 or 2 (0 points) [Never (0 pts)] : Never (0 points) [No] : In the past 12 months have you used drugs other than those required for medical reasons? No [0] : 2) Feeling down, depressed, or hopeless: Not at all (0) [PHQ-2 Negative - No further assessment needed] : PHQ-2 Negative - No further assessment needed [With Family] : lives with family [Employed] : employed [] :  [Sexually Active] : sexually active [4 or more  times a week (4 pts)] : 4 or more  times a week (4 points) [de-identified] : allergy. pulmonology. ENT [de-identified] : e-cigarretes (nicotine containing) smoked for two years [Audit-CScore] : 4 [de-identified] : no exercise [de-identified] : breakfast: bread. 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

## 2022-07-01 NOTE — HISTORY OF PRESENT ILLNESS
[Patient Declined  Services] : - None: Patient declined  services [FreeTextEntry1] : CPE [FreeTextEntry3] : Provider and Patient speak mutual language [TWNoteComboBox1] : German [de-identified] : 36 year old male Pmhx chronic sinusitis and nasal polyps who presents for CPE.\par \par No new ED visits or hospitalizations\par Visited ENT/allergy/pulmonology\par Received COVID vaccine x2 Moderna (4/7/21)\par No new allergies\par Takes allegra, acezalastine spray, fluticasone spray\par \par Denies fevers/chills/n/v/d/weight changes. Lives with wife and son. Works in [employer]. Sexually monagamous with wife.\par \par Of note patient was found to have transaminitis as well as polycythemia in prior labs. States he drinks 1 beer/day

## 2022-07-01 NOTE — HEALTH RISK ASSESSMENT
[Good] : ~his/her~  mood as  good [Current] : Current [Yes] : Yes [1 or 2 (0 pts)] : 1 or 2 (0 points) [Never (0 pts)] : Never (0 points) [No] : In the past 12 months have you used drugs other than those required for medical reasons? No [0] : 2) Feeling down, depressed, or hopeless: Not at all (0) [PHQ-2 Negative - No further assessment needed] : PHQ-2 Negative - No further assessment needed [With Family] : lives with family [Employed] : employed [] :  [Sexually Active] : sexually active [4 or more  times a week (4 pts)] : 4 or more  times a week (4 points) [de-identified] : allergy. pulmonology. ENT [de-identified] : e-cigarretes (nicotine containing) smoked for two years [Audit-CScore] : 4 [de-identified] : no exercise [de-identified] : breakfast: bread. lunch/dinner: rice and chicken [[id number]] : 0 [High Risk Behavior] : no high risk behavior [de-identified] : supermarket

## 2022-07-01 NOTE — HISTORY OF PRESENT ILLNESS
[Patient Declined  Services] : - None: Patient declined  services [FreeTextEntry1] : CPE [FreeTextEntry3] : Provider and Patient speak mutual language [TWNoteComboBox1] : Maldivian [de-identified] : 36 year old male Pmhx chronic sinusitis and nasal polyps who presents for CPE.\par \par No new ED visits or hospitalizations\par Visited ENT/allergy/pulmonology\par Received COVID vaccine x2 Moderna (4/7/21)\par No new allergies\par Takes allegra, acezalastine spray, fluticasone spray\par \par Denies fevers/chills/n/v/d/weight changes. Lives with wife and son. Works in [employer]. Sexually monagamous with wife.\par \par Of note patient was found to have transaminitis as well as polycythemia in prior labs. States he drinks 1 beer/day

## 2022-07-01 NOTE — PLAN
[FreeTextEntry1] : 36 year old male Pmhx chronic sinusitis and nasal polyps who presents for CPE\par \par ##transaminitis\par -evidence of transaminitis in the past, prior work up was negative\par -repeat work up with hepatitis panel, ferretin, copper\par -US abdomen. hepatology referral\par -repeat lipid profile\par -no asterixis\par \par ##polycythemia\par -repeat CBC\par -repeat ferretin\par -ZACHARY mutation test\par \par ##chronic sinusitis\par -appreciate Allergy input\par -IT as per A/I\par \par case d/w Dr. Perry\par Gulshan Mae MD, PGY-3\par Internal Medicine\par [facility]\par

## 2022-07-29 ENCOUNTER — APPOINTMENT (OUTPATIENT)
Dept: PEDIATRIC ALLERGY IMMUNOLOGY | Facility: CLINIC | Age: 37
End: 2022-07-29

## 2023-02-22 ENCOUNTER — OUTPATIENT (OUTPATIENT)
Dept: OUTPATIENT SERVICES | Facility: HOSPITAL | Age: 38
LOS: 1 days | End: 2023-02-22
Payer: SELF-PAY

## 2023-02-22 ENCOUNTER — APPOINTMENT (OUTPATIENT)
Dept: INTERNAL MEDICINE | Facility: CLINIC | Age: 38
End: 2023-02-22
Payer: COMMERCIAL

## 2023-02-22 VITALS
HEART RATE: 85 BPM | SYSTOLIC BLOOD PRESSURE: 130 MMHG | OXYGEN SATURATION: 97 % | WEIGHT: 183 LBS | BODY MASS INDEX: 27.74 KG/M2 | HEIGHT: 68 IN | DIASTOLIC BLOOD PRESSURE: 84 MMHG

## 2023-02-22 DIAGNOSIS — Z23 ENCOUNTER FOR IMMUNIZATION: ICD-10-CM

## 2023-02-22 DIAGNOSIS — I10 ESSENTIAL (PRIMARY) HYPERTENSION: ICD-10-CM

## 2023-02-22 DIAGNOSIS — R74.01 ELEVATION OF LEVELS OF LIVER TRANSAMINASE LEVELS: ICD-10-CM

## 2023-02-22 DIAGNOSIS — R73.03 PREDIABETES.: ICD-10-CM

## 2023-02-22 DIAGNOSIS — R09.81 NASAL CONGESTION: ICD-10-CM

## 2023-02-22 DIAGNOSIS — J33.9 NASAL POLYP, UNSPECIFIED: ICD-10-CM

## 2023-02-22 DIAGNOSIS — J30.9 ALLERGIC RHINITIS, UNSPECIFIED: ICD-10-CM

## 2023-02-22 DIAGNOSIS — R73.03 PREDIABETES: ICD-10-CM

## 2023-02-22 PROCEDURE — 83036 HEMOGLOBIN GLYCOSYLATED A1C: CPT

## 2023-02-22 PROCEDURE — T1013: CPT

## 2023-02-22 PROCEDURE — 90732 PPSV23 VACC 2 YRS+ SUBQ/IM: CPT

## 2023-02-22 PROCEDURE — 90688 IIV4 VACCINE SPLT 0.5 ML IM: CPT

## 2023-02-22 PROCEDURE — 85025 COMPLETE CBC W/AUTO DIFF WBC: CPT

## 2023-02-22 PROCEDURE — G0463: CPT | Mod: 25

## 2023-02-22 PROCEDURE — ZZZZZ: CPT | Mod: GE

## 2023-02-22 PROCEDURE — G0008: CPT

## 2023-02-22 PROCEDURE — 80053 COMPREHEN METABOLIC PANEL: CPT

## 2023-02-22 PROCEDURE — 80061 LIPID PANEL: CPT

## 2023-02-22 PROCEDURE — G0009: CPT

## 2023-02-22 NOTE — INTERPRETER SERVICES
[[employer] ] : provided by [employer]   [Time Spent: ____ minutes] : Total time spent using  services: [unfilled] minutes. The patient's primary language is not English thus required  services. [Interpreters_IDNumber] : [id number] [TWNoteComboBox1] : Citizen of [place]

## 2023-02-22 NOTE — PHYSICAL EXAM
[No Acute Distress] : no acute distress [Well Nourished] : well nourished [Normal Sclera/Conjunctiva] : normal sclera/conjunctiva [EOMI] : extraocular movements intact [Normal Outer Ear/Nose] : the outer ears and nose were normal in appearance [Normal Oropharynx] : the oropharynx was normal [No Lymphadenopathy] : no lymphadenopathy [Supple] : supple [No Respiratory Distress] : no respiratory distress  [No Accessory Muscle Use] : no accessory muscle use [Clear to Auscultation] : lungs were clear to auscultation bilaterally [Normal Rate] : normal rate  [Regular Rhythm] : with a regular rhythm [Normal S1, S2] : normal S1 and S2 [No Murmur] : no murmur heard [No Edema] : there was no peripheral edema [Soft] : abdomen soft [Non Tender] : non-tender [Grossly Normal Strength/Tone] : grossly normal strength/tone [No Rash] : no rash [No Focal Deficits] : no focal deficits [Normal Affect] : the affect was normal

## 2023-02-22 NOTE — HISTORY OF PRESENT ILLNESS
[FreeTextEntry1] : nasal congestion [de-identified] : 37M PMH chronic sinusitis and nasal polyps presenting for follow up of sinusitis\par \par #sinusitis/nasal polyps\par -nasal polyps seen 2018 and followed by ENT, last seen 02/2022 and polyps at the time too small for surgical removal\par -fluticasone spray was helping but no longer helping\par -has congestion symptoms from nasal polpys, feels like trouble breathing through both nostrils, no rhinorrhea, fevers, cough, epistaxis. Endorses some sinus pressure\par -Feels SOB and dizziness when congestion is worse, dizziness not positional, no LOC, no HA, only assc with SOB\par -Feels trigger is changes in temperature\par \par #HCM\par -A1c June 2022 6.6 and high cholesterol\par -Had significant weight loss since then, will repeat labs

## 2023-02-22 NOTE — ASSESSMENT
[FreeTextEntry1] : 37M PMH chronic sinusitis and nasal polyps presenting for follow up of sinusitis.\par \par #sinusitis/nasal polyps\par -worse symptoms now unrelieved by fluticasone\par -ENT referral to examine if polpys have enlarged\par -c/w fluticasone and saline sprays in the meantime\par \par #HCM/DM/transaminitis\par -A1c June 2022 6.6 and high cholesterol\par -Had significant weight loss since then, f/u repeat labs\par -mild transaminitis noted, does have hx of alcohol use, encouraged reduce intake and will follow up repeat labs\par -flu shot to be given this visit\par -pneumovax 23 given 2020, prevnar 20 given this visit\par -tdap 2024\par -patient phone number [phone]\par \par Case discussed with Dr. Hahn\par RTC 3 months

## 2023-02-24 ENCOUNTER — NON-APPOINTMENT (OUTPATIENT)
Age: 38
End: 2023-02-24

## 2023-02-24 LAB
ALBUMIN SERPL ELPH-MCNC: 4.8 G/DL
ALP BLD-CCNC: 67 U/L
ALT SERPL-CCNC: 20 U/L
ANION GAP SERPL CALC-SCNC: 13 MMOL/L
AST SERPL-CCNC: 20 U/L
BASOPHILS # BLD AUTO: 0.05 K/UL
BASOPHILS NFR BLD AUTO: 0.8 %
BILIRUB SERPL-MCNC: 1.2 MG/DL
BUN SERPL-MCNC: 16 MG/DL
CALCIUM SERPL-MCNC: 9.9 MG/DL
CHLORIDE SERPL-SCNC: 103 MMOL/L
CHOLEST SERPL-MCNC: 187 MG/DL
CO2 SERPL-SCNC: 24 MMOL/L
CREAT SERPL-MCNC: 0.77 MG/DL
EGFR: 103 ML/MIN/1.73M2
EOSINOPHIL # BLD AUTO: 0.37 K/UL
EOSINOPHIL NFR BLD AUTO: 5.6 %
ESTIMATED AVERAGE GLUCOSE: 114 MG/DL
GLUCOSE SERPL-MCNC: 102 MG/DL
HBA1C MFR BLD HPLC: 5.6 %
HCT VFR BLD CALC: 45.3 %
HDLC SERPL-MCNC: 46 MG/DL
HGB BLD-MCNC: 15.6 G/DL
IMM GRANULOCYTES NFR BLD AUTO: 0.2 %
LDLC SERPL CALC-MCNC: 117 MG/DL
LYMPHOCYTES # BLD AUTO: 1.83 K/UL
LYMPHOCYTES NFR BLD AUTO: 27.8 %
MAN DIFF?: NORMAL
MCHC RBC-ENTMCNC: 31.9 PG
MCHC RBC-ENTMCNC: 34.4 GM/DL
MCV RBC AUTO: 92.6 FL
MONOCYTES # BLD AUTO: 0.4 K/UL
MONOCYTES NFR BLD AUTO: 6.1 %
NEUTROPHILS # BLD AUTO: 3.93 K/UL
NEUTROPHILS NFR BLD AUTO: 59.5 %
NONHDLC SERPL-MCNC: 141 MG/DL
PLATELET # BLD AUTO: 240 K/UL
POTASSIUM SERPL-SCNC: 4.3 MMOL/L
PROT SERPL-MCNC: 7.5 G/DL
RBC # BLD: 4.89 M/UL
RBC # FLD: 12.5 %
SODIUM SERPL-SCNC: 141 MMOL/L
TRIGL SERPL-MCNC: 119 MG/DL
WBC # FLD AUTO: 6.59 K/UL

## 2023-03-24 ENCOUNTER — APPOINTMENT (OUTPATIENT)
Dept: PEDIATRIC ALLERGY IMMUNOLOGY | Facility: CLINIC | Age: 38
End: 2023-03-24

## 2023-06-07 ENCOUNTER — APPOINTMENT (OUTPATIENT)
Dept: INTERNAL MEDICINE | Facility: CLINIC | Age: 38
End: 2023-06-07